# Patient Record
Sex: MALE | Race: WHITE | Employment: OTHER | URBAN - METROPOLITAN AREA
[De-identification: names, ages, dates, MRNs, and addresses within clinical notes are randomized per-mention and may not be internally consistent; named-entity substitution may affect disease eponyms.]

---

## 2019-03-14 ENCOUNTER — OFFICE VISIT (OUTPATIENT)
Dept: SURGERY | Age: 69
End: 2019-03-14
Payer: MEDICARE

## 2019-03-14 VITALS
HEIGHT: 71 IN | BODY MASS INDEX: 22.4 KG/M2 | SYSTOLIC BLOOD PRESSURE: 160 MMHG | WEIGHT: 160 LBS | DIASTOLIC BLOOD PRESSURE: 92 MMHG

## 2019-03-14 DIAGNOSIS — R10.31 RLQ ABDOMINAL PAIN: Primary | ICD-10-CM

## 2019-03-14 PROCEDURE — 99203 OFFICE O/P NEW LOW 30 MIN: CPT | Performed by: SURGERY

## 2019-03-14 ASSESSMENT — ENCOUNTER SYMPTOMS
ABDOMINAL PAIN: 1
ALLERGIC/IMMUNOLOGIC NEGATIVE: 1
EYE ITCHING: 1
RESPIRATORY NEGATIVE: 1

## 2019-03-21 ENCOUNTER — TELEPHONE (OUTPATIENT)
Dept: SURGERY | Age: 69
End: 2019-03-21

## 2019-03-21 ENCOUNTER — HOSPITAL ENCOUNTER (INPATIENT)
Age: 69
LOS: 1 days | Discharge: HOME | DRG: 343 | End: 2019-03-23
Attending: EMERGENCY MEDICINE | Admitting: SURGERY
Payer: MEDICARE

## 2019-03-21 ENCOUNTER — HOSPITAL ENCOUNTER (OUTPATIENT)
Dept: CT IMAGING | Age: 69
Discharge: HOME | End: 2019-03-21
Payer: MEDICARE

## 2019-03-21 DIAGNOSIS — K35.80 ACUTE APPENDICITIS, UNSPECIFIED ACUTE APPENDICITIS TYPE: Primary | ICD-10-CM

## 2019-03-21 DIAGNOSIS — R10.31 RLQ ABDOMINAL PAIN: ICD-10-CM

## 2019-03-21 LAB
CREAT SERPL-MCNC: 1 MG/DL (ref 0.8–1.3)
GFR AFRICAN AMERICAN: >60
GFR NON-AFRICAN AMERICAN: >60

## 2019-03-21 PROCEDURE — 6360000004 HC RX CONTRAST MEDICATION: Performed by: SURGERY

## 2019-03-21 PROCEDURE — 80053 COMPREHEN METABOLIC PANEL: CPT

## 2019-03-21 PROCEDURE — 99284 EMERGENCY DEPT VISIT MOD MDM: CPT

## 2019-03-21 PROCEDURE — 6360000002 HC RX W HCPCS: Performed by: PHYSICIAN ASSISTANT

## 2019-03-21 PROCEDURE — 36415 COLL VENOUS BLD VENIPUNCTURE: CPT

## 2019-03-21 PROCEDURE — 74177 CT ABD & PELVIS W/CONTRAST: CPT

## 2019-03-21 PROCEDURE — 85025 COMPLETE CBC W/AUTO DIFF WBC: CPT

## 2019-03-21 PROCEDURE — 82565 ASSAY OF CREATININE: CPT

## 2019-03-21 RX ADMIN — TAZOBACTAM SODIUM AND PIPERACILLIN SODIUM 3.38 G: 375; 3 INJECTION, SOLUTION INTRAVENOUS at 23:57

## 2019-03-21 RX ADMIN — IOPAMIDOL 75 ML: 755 INJECTION, SOLUTION INTRAVENOUS at 18:33

## 2019-03-21 RX ADMIN — IOHEXOL 50 ML: 240 INJECTION, SOLUTION INTRATHECAL; INTRAVASCULAR; INTRAVENOUS; ORAL at 18:33

## 2019-03-21 ASSESSMENT — PAIN SCALES - GENERAL: PAINLEVEL_OUTOF10: 4

## 2019-03-22 ENCOUNTER — ANESTHESIA EVENT (OUTPATIENT)
Dept: OPERATING ROOM | Age: 69
DRG: 343 | End: 2019-03-22
Payer: MEDICARE

## 2019-03-22 ENCOUNTER — ANESTHESIA (OUTPATIENT)
Dept: OPERATING ROOM | Age: 69
DRG: 343 | End: 2019-03-22
Payer: MEDICARE

## 2019-03-22 VITALS
SYSTOLIC BLOOD PRESSURE: 154 MMHG | RESPIRATION RATE: 5 BRPM | TEMPERATURE: 97.9 F | DIASTOLIC BLOOD PRESSURE: 92 MMHG | OXYGEN SATURATION: 100 %

## 2019-03-22 PROBLEM — K35.80 APPENDICITIS, ACUTE: Status: ACTIVE | Noted: 2019-03-22

## 2019-03-22 LAB
A/G RATIO: 1.7 (ref 1.1–2.2)
ALBUMIN SERPL-MCNC: 4.4 G/DL (ref 3.4–5)
ALP BLD-CCNC: 52 U/L (ref 40–129)
ALT SERPL-CCNC: 9 U/L (ref 10–40)
ANION GAP SERPL CALCULATED.3IONS-SCNC: 11 MMOL/L (ref 3–16)
AST SERPL-CCNC: 14 U/L (ref 15–37)
BASOPHILS ABSOLUTE: 0 K/UL (ref 0–0.2)
BASOPHILS RELATIVE PERCENT: 0.9 %
BILIRUB SERPL-MCNC: 0.5 MG/DL (ref 0–1)
BUN BLDV-MCNC: 18 MG/DL (ref 7–20)
CALCIUM SERPL-MCNC: 8.9 MG/DL (ref 8.3–10.6)
CHLORIDE BLD-SCNC: 100 MMOL/L (ref 99–110)
CO2: 27 MMOL/L (ref 21–32)
CREAT SERPL-MCNC: 1 MG/DL (ref 0.8–1.3)
EOSINOPHILS ABSOLUTE: 0.1 K/UL (ref 0–0.6)
EOSINOPHILS RELATIVE PERCENT: 3.1 %
GFR AFRICAN AMERICAN: >60
GFR NON-AFRICAN AMERICAN: >60
GLOBULIN: 2.6 G/DL
GLUCOSE BLD-MCNC: 91 MG/DL (ref 70–99)
HCT VFR BLD CALC: 40 % (ref 40.5–52.5)
HEMOGLOBIN: 13.9 G/DL (ref 13.5–17.5)
LYMPHOCYTES ABSOLUTE: 1.3 K/UL (ref 1–5.1)
LYMPHOCYTES RELATIVE PERCENT: 30.9 %
MCH RBC QN AUTO: 33.5 PG (ref 26–34)
MCHC RBC AUTO-ENTMCNC: 34.8 G/DL (ref 31–36)
MCV RBC AUTO: 96.1 FL (ref 80–100)
MONOCYTES ABSOLUTE: 0.2 K/UL (ref 0–1.3)
MONOCYTES RELATIVE PERCENT: 5.1 %
NEUTROPHILS ABSOLUTE: 2.4 K/UL (ref 1.7–7.7)
NEUTROPHILS RELATIVE PERCENT: 60 %
PDW BLD-RTO: 13.3 % (ref 12.4–15.4)
PLATELET # BLD: 133 K/UL (ref 135–450)
PMV BLD AUTO: 9.6 FL (ref 5–10.5)
POTASSIUM SERPL-SCNC: 4.1 MMOL/L (ref 3.5–5.1)
RBC # BLD: 4.16 M/UL (ref 4.2–5.9)
SODIUM BLD-SCNC: 138 MMOL/L (ref 136–145)
TOTAL PROTEIN: 7 G/DL (ref 6.4–8.2)
WBC # BLD: 4 K/UL (ref 4–11)

## 2019-03-22 PROCEDURE — 6360000002 HC RX W HCPCS: Performed by: ANESTHESIOLOGY

## 2019-03-22 PROCEDURE — 2580000003 HC RX 258: Performed by: SURGERY

## 2019-03-22 PROCEDURE — 6360000002 HC RX W HCPCS: Performed by: SURGERY

## 2019-03-22 PROCEDURE — 2500000003 HC RX 250 WO HCPCS: Performed by: NURSE ANESTHETIST, CERTIFIED REGISTERED

## 2019-03-22 PROCEDURE — 0DTJ4ZZ RESECTION OF APPENDIX, PERCUTANEOUS ENDOSCOPIC APPROACH: ICD-10-PCS | Performed by: SURGERY

## 2019-03-22 PROCEDURE — 2500000003 HC RX 250 WO HCPCS: Performed by: SURGERY

## 2019-03-22 PROCEDURE — 6360000002 HC RX W HCPCS: Performed by: NURSE ANESTHETIST, CERTIFIED REGISTERED

## 2019-03-22 PROCEDURE — 7100000000 HC PACU RECOVERY - FIRST 15 MIN: Performed by: SURGERY

## 2019-03-22 PROCEDURE — 7100000001 HC PACU RECOVERY - ADDTL 15 MIN: Performed by: SURGERY

## 2019-03-22 PROCEDURE — 88304 TISSUE EXAM BY PATHOLOGIST: CPT

## 2019-03-22 PROCEDURE — 99233 SBSQ HOSP IP/OBS HIGH 50: CPT | Performed by: SURGERY

## 2019-03-22 PROCEDURE — 6360000002 HC RX W HCPCS: Performed by: NURSE PRACTITIONER

## 2019-03-22 PROCEDURE — 1200000000 HC SEMI PRIVATE

## 2019-03-22 PROCEDURE — 44970 LAPAROSCOPY APPENDECTOMY: CPT | Performed by: SURGERY

## 2019-03-22 PROCEDURE — APPNB30 APP NON BILLABLE TIME 0-30 MINS: Performed by: NURSE PRACTITIONER

## 2019-03-22 PROCEDURE — C1773 RET DEV, INSERTABLE: HCPCS | Performed by: SURGERY

## 2019-03-22 PROCEDURE — 2709999900 HC NON-CHARGEABLE SUPPLY: Performed by: SURGERY

## 2019-03-22 PROCEDURE — 3600000004 HC SURGERY LEVEL 4 BASE: Performed by: SURGERY

## 2019-03-22 PROCEDURE — 3700000001 HC ADD 15 MINUTES (ANESTHESIA): Performed by: SURGERY

## 2019-03-22 PROCEDURE — APPSS45 APP SPLIT SHARED TIME 31-45 MINUTES: Performed by: NURSE PRACTITIONER

## 2019-03-22 PROCEDURE — 2500000003 HC RX 250 WO HCPCS: Performed by: NURSE PRACTITIONER

## 2019-03-22 PROCEDURE — 3700000000 HC ANESTHESIA ATTENDED CARE: Performed by: SURGERY

## 2019-03-22 PROCEDURE — 2720000010 HC SURG SUPPLY STERILE: Performed by: SURGERY

## 2019-03-22 PROCEDURE — 3600000014 HC SURGERY LEVEL 4 ADDTL 15MIN: Performed by: SURGERY

## 2019-03-22 PROCEDURE — 6370000000 HC RX 637 (ALT 250 FOR IP): Performed by: SURGERY

## 2019-03-22 PROCEDURE — 2580000003 HC RX 258: Performed by: NURSE ANESTHETIST, CERTIFIED REGISTERED

## 2019-03-22 RX ORDER — HYDRALAZINE HYDROCHLORIDE 20 MG/ML
10 INJECTION INTRAMUSCULAR; INTRAVENOUS EVERY 6 HOURS PRN
Status: DISCONTINUED | OUTPATIENT
Start: 2019-03-22 | End: 2019-03-23 | Stop reason: HOSPADM

## 2019-03-22 RX ORDER — MIDAZOLAM HYDROCHLORIDE 1 MG/ML
0.5 INJECTION INTRAMUSCULAR; INTRAVENOUS ONCE
Status: COMPLETED | OUTPATIENT
Start: 2019-03-22 | End: 2019-03-22

## 2019-03-22 RX ORDER — SODIUM CHLORIDE, SODIUM LACTATE, POTASSIUM CHLORIDE, CALCIUM CHLORIDE 600; 310; 30; 20 MG/100ML; MG/100ML; MG/100ML; MG/100ML
INJECTION, SOLUTION INTRAVENOUS CONTINUOUS PRN
Status: DISCONTINUED | OUTPATIENT
Start: 2019-03-22 | End: 2019-03-22 | Stop reason: SDUPTHER

## 2019-03-22 RX ORDER — MORPHINE SULFATE 2 MG/ML
2 INJECTION, SOLUTION INTRAMUSCULAR; INTRAVENOUS
Status: DISCONTINUED | OUTPATIENT
Start: 2019-03-22 | End: 2019-03-23 | Stop reason: HOSPADM

## 2019-03-22 RX ORDER — HYDROMORPHONE HCL 110MG/55ML
0.25 PATIENT CONTROLLED ANALGESIA SYRINGE INTRAVENOUS EVERY 5 MIN PRN
Status: DISCONTINUED | OUTPATIENT
Start: 2019-03-22 | End: 2019-03-22 | Stop reason: HOSPADM

## 2019-03-22 RX ORDER — SODIUM CHLORIDE, SODIUM LACTATE, POTASSIUM CHLORIDE, CALCIUM CHLORIDE 600; 310; 30; 20 MG/100ML; MG/100ML; MG/100ML; MG/100ML
INJECTION, SOLUTION INTRAVENOUS CONTINUOUS
Status: DISCONTINUED | OUTPATIENT
Start: 2019-03-22 | End: 2019-03-23 | Stop reason: HOSPADM

## 2019-03-22 RX ORDER — FENTANYL CITRATE 50 UG/ML
INJECTION, SOLUTION INTRAMUSCULAR; INTRAVENOUS PRN
Status: DISCONTINUED | OUTPATIENT
Start: 2019-03-22 | End: 2019-03-22 | Stop reason: SDUPTHER

## 2019-03-22 RX ORDER — LIDOCAINE HYDROCHLORIDE 10 MG/ML
1 INJECTION, SOLUTION EPIDURAL; INFILTRATION; INTRACAUDAL; PERINEURAL
Status: CANCELLED | OUTPATIENT
Start: 2019-03-22 | End: 2019-03-22

## 2019-03-22 RX ORDER — HYDROCODONE BITARTRATE AND ACETAMINOPHEN 5; 325 MG/1; MG/1
1 TABLET ORAL
Status: DISCONTINUED | OUTPATIENT
Start: 2019-03-22 | End: 2019-03-22 | Stop reason: HOSPADM

## 2019-03-22 RX ORDER — PROPOFOL 10 MG/ML
INJECTION, EMULSION INTRAVENOUS PRN
Status: DISCONTINUED | OUTPATIENT
Start: 2019-03-22 | End: 2019-03-22 | Stop reason: SDUPTHER

## 2019-03-22 RX ORDER — SUCCINYLCHOLINE CHLORIDE 20 MG/ML
INJECTION INTRAMUSCULAR; INTRAVENOUS PRN
Status: DISCONTINUED | OUTPATIENT
Start: 2019-03-22 | End: 2019-03-22 | Stop reason: SDUPTHER

## 2019-03-22 RX ORDER — TRISODIUM CITRATE DIHYDRATE AND CITRIC ACID MONOHYDRATE 500; 334 MG/5ML; MG/5ML
SOLUTION ORAL
Status: DISPENSED
Start: 2019-03-22 | End: 2019-03-23

## 2019-03-22 RX ORDER — FUROSEMIDE 10 MG/ML
20 INJECTION INTRAMUSCULAR; INTRAVENOUS ONCE
Status: COMPLETED | OUTPATIENT
Start: 2019-03-22 | End: 2019-03-22

## 2019-03-22 RX ORDER — SODIUM CHLORIDE 9 MG/ML
INJECTION, SOLUTION INTRAVENOUS CONTINUOUS
Status: CANCELLED | OUTPATIENT
Start: 2019-03-22

## 2019-03-22 RX ORDER — BUPIVACAINE HYDROCHLORIDE AND EPINEPHRINE 5; 5 MG/ML; UG/ML
INJECTION, SOLUTION PERINEURAL
Status: COMPLETED | OUTPATIENT
Start: 2019-03-22 | End: 2019-03-22

## 2019-03-22 RX ORDER — ONDANSETRON 2 MG/ML
4 INJECTION INTRAMUSCULAR; INTRAVENOUS
Status: DISCONTINUED | OUTPATIENT
Start: 2019-03-22 | End: 2019-03-22 | Stop reason: HOSPADM

## 2019-03-22 RX ORDER — ONDANSETRON 2 MG/ML
INJECTION INTRAMUSCULAR; INTRAVENOUS PRN
Status: DISCONTINUED | OUTPATIENT
Start: 2019-03-22 | End: 2019-03-22 | Stop reason: SDUPTHER

## 2019-03-22 RX ORDER — MAGNESIUM HYDROXIDE 1200 MG/15ML
LIQUID ORAL CONTINUOUS PRN
Status: COMPLETED | OUTPATIENT
Start: 2019-03-22 | End: 2019-03-22

## 2019-03-22 RX ORDER — GLYCOPYRROLATE 0.2 MG/ML
INJECTION INTRAMUSCULAR; INTRAVENOUS PRN
Status: DISCONTINUED | OUTPATIENT
Start: 2019-03-22 | End: 2019-03-22 | Stop reason: SDUPTHER

## 2019-03-22 RX ORDER — DEXAMETHASONE SODIUM PHOSPHATE 4 MG/ML
INJECTION, SOLUTION INTRA-ARTICULAR; INTRALESIONAL; INTRAMUSCULAR; INTRAVENOUS; SOFT TISSUE PRN
Status: DISCONTINUED | OUTPATIENT
Start: 2019-03-22 | End: 2019-03-22 | Stop reason: SDUPTHER

## 2019-03-22 RX ORDER — LIDOCAINE HYDROCHLORIDE 20 MG/ML
INJECTION, SOLUTION EPIDURAL; INFILTRATION; INTRACAUDAL; PERINEURAL PRN
Status: DISCONTINUED | OUTPATIENT
Start: 2019-03-22 | End: 2019-03-22 | Stop reason: SDUPTHER

## 2019-03-22 RX ORDER — VECURONIUM BROMIDE 1 MG/ML
INJECTION, POWDER, LYOPHILIZED, FOR SOLUTION INTRAVENOUS PRN
Status: DISCONTINUED | OUTPATIENT
Start: 2019-03-22 | End: 2019-03-22 | Stop reason: SDUPTHER

## 2019-03-22 RX ORDER — OXYCODONE HYDROCHLORIDE AND ACETAMINOPHEN 5; 325 MG/1; MG/1
1 TABLET ORAL EVERY 4 HOURS PRN
Status: DISCONTINUED | OUTPATIENT
Start: 2019-03-22 | End: 2019-03-23 | Stop reason: HOSPADM

## 2019-03-22 RX ORDER — ONDANSETRON 2 MG/ML
4 INJECTION INTRAMUSCULAR; INTRAVENOUS EVERY 4 HOURS PRN
Status: DISCONTINUED | OUTPATIENT
Start: 2019-03-22 | End: 2019-03-23 | Stop reason: HOSPADM

## 2019-03-22 RX ORDER — HYDROMORPHONE HCL 110MG/55ML
0.5 PATIENT CONTROLLED ANALGESIA SYRINGE INTRAVENOUS EVERY 5 MIN PRN
Status: DISCONTINUED | OUTPATIENT
Start: 2019-03-22 | End: 2019-03-22 | Stop reason: HOSPADM

## 2019-03-22 RX ORDER — AMLODIPINE BESYLATE 5 MG/1
5 TABLET ORAL DAILY
Status: COMPLETED | OUTPATIENT
Start: 2019-03-22 | End: 2019-03-22

## 2019-03-22 RX ADMIN — TAZOBACTAM SODIUM AND PIPERACILLIN SODIUM 3.38 G: 375; 3 INJECTION, SOLUTION INTRAVENOUS at 21:31

## 2019-03-22 RX ADMIN — VECURONIUM BROMIDE 5 MG: 1 INJECTION, POWDER, LYOPHILIZED, FOR SOLUTION INTRAVENOUS at 15:43

## 2019-03-22 RX ADMIN — PHENYLEPHRINE HYDROCHLORIDE 100 MCG: 10 INJECTION INTRAVENOUS at 15:58

## 2019-03-22 RX ADMIN — OXYCODONE AND ACETAMINOPHEN 1 TABLET: 5; 325 TABLET ORAL at 21:27

## 2019-03-22 RX ADMIN — HYDRALAZINE HYDROCHLORIDE 10 MG: 20 INJECTION INTRAMUSCULAR; INTRAVENOUS at 21:33

## 2019-03-22 RX ADMIN — HYDROMORPHONE HYDROCHLORIDE 0.5 MG: 2 INJECTION, SOLUTION INTRAMUSCULAR; INTRAVENOUS; SUBCUTANEOUS at 16:42

## 2019-03-22 RX ADMIN — ONDANSETRON 4 MG: 2 INJECTION INTRAMUSCULAR; INTRAVENOUS at 15:35

## 2019-03-22 RX ADMIN — TAZOBACTAM SODIUM AND PIPERACILLIN SODIUM 3.38 G: 375; 3 INJECTION, SOLUTION INTRAVENOUS at 05:45

## 2019-03-22 RX ADMIN — TAZOBACTAM SODIUM AND PIPERACILLIN SODIUM 3.38 G: 375; 3 INJECTION, SOLUTION INTRAVENOUS at 13:37

## 2019-03-22 RX ADMIN — FAMOTIDINE 20 MG: 10 INJECTION, SOLUTION INTRAVENOUS at 09:04

## 2019-03-22 RX ADMIN — SUCCINYLCHOLINE CHLORIDE 160 MG: 20 INJECTION, SOLUTION INTRAMUSCULAR; INTRAVENOUS at 15:35

## 2019-03-22 RX ADMIN — SODIUM CHLORIDE, POTASSIUM CHLORIDE, SODIUM LACTATE AND CALCIUM CHLORIDE: 600; 310; 30; 20 INJECTION, SOLUTION INTRAVENOUS at 15:28

## 2019-03-22 RX ADMIN — MIDAZOLAM 0.5 MG: 1 INJECTION INTRAMUSCULAR; INTRAVENOUS at 14:54

## 2019-03-22 RX ADMIN — MORPHINE SULFATE 2 MG: 2 INJECTION, SOLUTION INTRAMUSCULAR; INTRAVENOUS at 03:24

## 2019-03-22 RX ADMIN — LIDOCAINE HYDROCHLORIDE 100 MG: 20 INJECTION, SOLUTION EPIDURAL; INFILTRATION; INTRACAUDAL; PERINEURAL at 15:35

## 2019-03-22 RX ADMIN — SODIUM CHLORIDE, POTASSIUM CHLORIDE, SODIUM LACTATE AND CALCIUM CHLORIDE: 600; 310; 30; 20 INJECTION, SOLUTION INTRAVENOUS at 13:37

## 2019-03-22 RX ADMIN — SODIUM CHLORIDE, POTASSIUM CHLORIDE, SODIUM LACTATE AND CALCIUM CHLORIDE: 600; 310; 30; 20 INJECTION, SOLUTION INTRAVENOUS at 17:39

## 2019-03-22 RX ADMIN — FENTANYL CITRATE 25 MCG: 50 INJECTION, SOLUTION INTRAMUSCULAR; INTRAVENOUS at 16:16

## 2019-03-22 RX ADMIN — FENTANYL CITRATE 25 MCG: 50 INJECTION, SOLUTION INTRAMUSCULAR; INTRAVENOUS at 16:19

## 2019-03-22 RX ADMIN — SODIUM CHLORIDE, POTASSIUM CHLORIDE, SODIUM LACTATE AND CALCIUM CHLORIDE: 600; 310; 30; 20 INJECTION, SOLUTION INTRAVENOUS at 01:32

## 2019-03-22 RX ADMIN — MORPHINE SULFATE 2 MG: 2 INJECTION, SOLUTION INTRAMUSCULAR; INTRAVENOUS at 01:32

## 2019-03-22 RX ADMIN — FAMOTIDINE 20 MG: 10 INJECTION, SOLUTION INTRAVENOUS at 21:28

## 2019-03-22 RX ADMIN — DEXAMETHASONE SODIUM PHOSPHATE 4 MG: 4 INJECTION, SOLUTION INTRAMUSCULAR; INTRAVENOUS at 15:35

## 2019-03-22 RX ADMIN — GLYCOPYRROLATE 0.1 MG: 0.2 INJECTION INTRAMUSCULAR; INTRAVENOUS at 15:58

## 2019-03-22 RX ADMIN — PHENYLEPHRINE HYDROCHLORIDE 100 MCG: 10 INJECTION INTRAVENOUS at 15:49

## 2019-03-22 RX ADMIN — PROPOFOL 150 MG: 10 INJECTION, EMULSION INTRAVENOUS at 15:35

## 2019-03-22 RX ADMIN — FUROSEMIDE 20 MG: 10 INJECTION, SOLUTION INTRAMUSCULAR; INTRAVENOUS at 07:08

## 2019-03-22 RX ADMIN — FENTANYL CITRATE 100 MCG: 50 INJECTION, SOLUTION INTRAMUSCULAR; INTRAVENOUS at 15:35

## 2019-03-22 RX ADMIN — SUGAMMADEX 300 MG: 100 INJECTION, SOLUTION INTRAVENOUS at 16:15

## 2019-03-22 RX ADMIN — ENOXAPARIN SODIUM 40 MG: 40 INJECTION SUBCUTANEOUS at 21:27

## 2019-03-22 RX ADMIN — GLYCOPYRROLATE 0.2 MG: 0.2 INJECTION INTRAMUSCULAR; INTRAVENOUS at 15:49

## 2019-03-22 RX ADMIN — AMLODIPINE BESYLATE 5 MG: 5 TABLET ORAL at 07:08

## 2019-03-22 RX ADMIN — HYDRALAZINE HYDROCHLORIDE 10 MG: 20 INJECTION INTRAMUSCULAR; INTRAVENOUS at 11:28

## 2019-03-22 ASSESSMENT — PAIN DESCRIPTION - PAIN TYPE
TYPE: SURGICAL PAIN
TYPE: SURGICAL PAIN
TYPE: ACUTE PAIN

## 2019-03-22 ASSESSMENT — PULMONARY FUNCTION TESTS
PIF_VALUE: 18
PIF_VALUE: 15
PIF_VALUE: 11
PIF_VALUE: 14
PIF_VALUE: 19
PIF_VALUE: 16
PIF_VALUE: 15
PIF_VALUE: 3
PIF_VALUE: 18
PIF_VALUE: 0
PIF_VALUE: 18
PIF_VALUE: 1
PIF_VALUE: 11
PIF_VALUE: 18
PIF_VALUE: 16
PIF_VALUE: 20
PIF_VALUE: 1
PIF_VALUE: 18
PIF_VALUE: 14
PIF_VALUE: 19
PIF_VALUE: 15
PIF_VALUE: 23
PIF_VALUE: 19
PIF_VALUE: 1
PIF_VALUE: 13
PIF_VALUE: 11
PIF_VALUE: 21
PIF_VALUE: 18
PIF_VALUE: 14
PIF_VALUE: 15
PIF_VALUE: 2
PIF_VALUE: 15
PIF_VALUE: 17
PIF_VALUE: 19
PIF_VALUE: 15
PIF_VALUE: 18
PIF_VALUE: 3
PIF_VALUE: 1
PIF_VALUE: 3
PIF_VALUE: 1
PIF_VALUE: 1
PIF_VALUE: 0
PIF_VALUE: 18
PIF_VALUE: 6
PIF_VALUE: 18
PIF_VALUE: 14
PIF_VALUE: 12
PIF_VALUE: 19
PIF_VALUE: 1
PIF_VALUE: 14
PIF_VALUE: 13
PIF_VALUE: 18
PIF_VALUE: 15
PIF_VALUE: 15
PIF_VALUE: 14
PIF_VALUE: 18
PIF_VALUE: 12
PIF_VALUE: 17

## 2019-03-22 ASSESSMENT — ENCOUNTER SYMPTOMS
COLOR CHANGE: 0
COUGH: 0
SHORTNESS OF BREATH: 0
ALLERGIC/IMMUNOLOGIC NEGATIVE: 1
NAUSEA: 0
ABDOMINAL DISTENTION: 0
DIARRHEA: 0
VOMITING: 0
WHEEZING: 0
CONSTIPATION: 0
BACK PAIN: 0
STRIDOR: 0
ABDOMINAL PAIN: 1

## 2019-03-22 ASSESSMENT — PAIN SCALES - GENERAL
PAINLEVEL_OUTOF10: 7
PAINLEVEL_OUTOF10: 4
PAINLEVEL_OUTOF10: 7
PAINLEVEL_OUTOF10: 1
PAINLEVEL_OUTOF10: 4
PAINLEVEL_OUTOF10: 1
PAINLEVEL_OUTOF10: 1

## 2019-03-22 ASSESSMENT — PAIN DESCRIPTION - DESCRIPTORS
DESCRIPTORS: DISCOMFORT
DESCRIPTORS: SHARP;PRESSURE
DESCRIPTORS: DISCOMFORT

## 2019-03-22 ASSESSMENT — PAIN DESCRIPTION - LOCATION
LOCATION: ABDOMEN

## 2019-03-22 ASSESSMENT — PAIN DESCRIPTION - ONSET
ONSET: ON-GOING
ONSET: ON-GOING

## 2019-03-22 ASSESSMENT — PAIN DESCRIPTION - ORIENTATION
ORIENTATION: RIGHT;LOWER
ORIENTATION: UPPER;MID
ORIENTATION: MID
ORIENTATION: RIGHT;LOWER

## 2019-03-22 ASSESSMENT — PAIN - FUNCTIONAL ASSESSMENT: PAIN_FUNCTIONAL_ASSESSMENT: 0-10

## 2019-03-22 ASSESSMENT — PAIN DESCRIPTION - FREQUENCY
FREQUENCY: CONTINUOUS
FREQUENCY: CONTINUOUS

## 2019-03-22 ASSESSMENT — PAIN DESCRIPTION - PROGRESSION
CLINICAL_PROGRESSION: NOT CHANGED
CLINICAL_PROGRESSION: NOT CHANGED

## 2019-03-22 NOTE — PROGRESS NOTES
Darell Huynh, general surgery NP was in to see and speak to pt regarding lap appy planned for later this afternoon. B/P rechecked, was 170/96 and HR 76. Darell Huynh made aware of continued elevated blood pressure. New orders written.

## 2019-03-22 NOTE — PROGRESS NOTES
Pt to PACU in bed and in semi fowlers position. Resp adeq on 4L o2 NC, spo2 100%, VSS. Pt alert, denies needs or c/o. Surgical incisions to abdomen CDI, abdomen soft. Fluid infusing through IV per anesthesia. No s/s distress noted. Will continue to monitor.

## 2019-03-22 NOTE — H&P
[facility] [address] [facility]        History and Physical    Patient Name: Ale Verdin  MRN: [id number]  YOB: 1950  Admission Date: 3/21/2019 11:30 PM   Date of Evaluation: 3/22/2019  Primary Care Physician: No primary care provider on file. SUBJECTIVE    History of Present Illness:  Mr. Chamorro is a 77 y.o. male who presents with severe, burning, right lower quadrant abdominal pain that began about 2 weeks earlier and gradually worsened. The pain is nonradiating and worse with movement. He was evaluated by Dr. Aracelis Baltazar in the office about 8 days earlier for complains of a bulge in the abdominal wall in the same location. Outpatient CT imaging yesterday revealed acute appendicitis and he was admitted for further treatment. Associated symptoms include abdominal bloating, along with diarrhea. He denies nausea, vomiting, changes in appetite, fevers, chills, hematochezia/melena, dysuria, hematuria, chest pain, SOB, or similar symptoms in the past.  Prior surgeries include a right inguinal hernia repair in 2013. No significant medical history, no blood thinners. He is a nonsmoker. Past Medical History:      Diagnosis Date    Hernia of scrotum        Past Surgical History:      Procedure Laterality Date    INGUINAL HERNIA REPAIR  8/6/2013    RIGHT    NOSE SURGERY         Scheduled Meds:   piperacillin-tazobactam  3.375 g Intravenous Q8H    enoxaparin  40 mg Subcutaneous Nightly    famotidine (PEPCID) injection  20 mg Intravenous BID     Continuous Infusions:   lactated ringers 100 mL/hr at 03/22/19 0132     PRN Meds:.morphine, oxyCODONE-acetaminophen, ondansetron    Prior to Admission medications    Not on File        Allergies:  Patient has no known allergies.     Social History:  Social History     Socioeconomic History    Marital status:      Spouse name: None    Number of children: None    Years of education: None    Highest education level: None   Occupational History    None   Social Needs    Financial resource strain: None    Food insecurity:     Worry: None     Inability: None    Transportation needs:     Medical: None     Non-medical: None   Tobacco Use    Smoking status: Never Smoker    Smokeless tobacco: Never Used   Substance and Sexual Activity    Alcohol use: No    Drug use: No    Sexual activity: None   Lifestyle    Physical activity:     Days per week: None     Minutes per session: None    Stress: None   Relationships    Social connections:     Talks on phone: None     Gets together: None     Attends Jew service: None     Active member of club or organization: None     Attends meetings of clubs or organizations: None     Relationship status: None    Intimate partner violence:     Fear of current or ex partner: None     Emotionally abused: None     Physically abused: None     Forced sexual activity: None   Other Topics Concern    None   Social History Narrative    None       Family History:    History reviewed. No pertinent family history.     Review of Systems:  CONSTITUTIONAL:  negative  HEENT:  negative  RESPIRATORY:  negative  CARDIOVASCULAR:  negative  GASTROINTESTINAL:  negative except for diarrhea, abdominal pain and abdominal distention  GENITOURINARY:  negative  HEMATOLOGIC/LYMPHATIC:  negative  NEUROLOGICAL:  negative  SKIN:  negative      OBJECTIVE    Vital Signs:  Patient Vitals for the past 24 hrs:   BP Temp Temp src Pulse Resp SpO2 Height Weight   03/22/19 0815 (!) 153/102 98.6 °F (37 °C) Oral 76 16 97 % -- --   03/22/19 0610 (!) 175/101 -- -- 77 16 99 % -- --   03/22/19 0600 (!) 182/100 -- -- 74 16 -- -- --   03/22/19 0421 (!) 166/89 -- -- -- -- -- -- --   03/22/19 0402 (!) 169/100 -- -- -- -- -- -- --   03/22/19 0400 (!) 177/103 98.4 °F (36.9 °C) Oral 70 18 -- -- --   03/22/19 0200 (!) 166/98 -- -- 69 -- -- -- --   03/22/19 0030 (!) 168/100 98.1 °F (36.7 °C) Oral -- 16 97 % 5' 11\" (1.803 m) 156 lb 4.8 oz (70.9 kg)   19 0015 (!) 158/100 -- -- 70 16 97 % -- --   19 2345 (!) 163/100 -- -- 77 16 96 % -- --   19 2235 (!) 186/101 97.6 °F (36.4 °C) -- 86 16 98 % 5' 11\" (1.803 m) 160 lb (72.6 kg)      TEMPERATURE HISTORY 24H: Temp (24hrs), Av.2 °F (36.8 °C), Min:97.6 °F (36.4 °C), Max:98.6 °F (37 °C)    BLOOD PRESSURE HISTORY: Systolic (81DLK), SBP:530 , Min:153 , SVC:395    Diastolic (17XIR), QNB:76, Min:89, Max:103    Admission Weight: 160 lb (72.6 kg)       Intake/Output Summary (Last 24 hours) at 3/22/2019 0914  Last data filed at 3/22/2019 5752  Gross per 24 hour   Intake 471.67 ml   Output 900 ml   Net -428.33 ml     Drain/tube Output:         Physical Exam:  CONSTITUTIONAL:  alert, no apparent distress and normal weight  NEUROLOGIC:  Mental Status Exam:  Level of Alertness:   awake  Orientation:   person, place, time  EYES:  sclera clear  ENT:  normocepalic, without obvious abnormality  NECK:  supple, symmetrical, trachea midline  LUNGS:  clear to auscultation  CARDIOVASCULAR:  regular rate and rhythm and no murmur noted  ABDOMEN: soft, non-distended, mild tenderness noted in the right lower quadrant, voluntary guarding absent, no masses palpated, normal bowel sounds, and hernia absent  Extremities: no edema  SKIN:  no bruising or bleeding and normal skin color, texture, turgor    Labs:  CBC:    Recent Labs     19  2355   WBC 4.0   HGB 13.9   HCT 40.0*   *     BMP:    Recent Labs     19  1739 19  2355   NA  --  138   K  --  4.1   CL  --  100   CO2  --  27   BUN  --  18   CREATININE 1.0 1.0   GLUCOSE  --  91     Hepatic:   Recent Labs     19  2355   AST 14*   ALT 9*   BILITOT 0.5   ALKPHOS 52     Amylase: No results for input(s): AMYLASE in the last 72 hours. Lipase: No results for input(s): LIPASE in the last 72 hours. Mag:    No results for input(s): MG in the last 72 hours. Phos:   No results for input(s): PHOS in the last 72 hours.    Coags: No results for input(s): INR, APTT in the last 72 hours. Imaging:  I have personally reviewed the following films:  CT ABDOMEN PELVIS W IV CONTRAST Additional Contrast? Oral  Narrative: EXAMINATION:  CT OF THE ABDOMEN AND PELVIS WITH CONTRAST 3/21/2019 3:17 pm    TECHNIQUE:  CT of the abdomen and pelvis was performed with the administration of  intravenous contrast. Multiplanar reformatted images are provided for review. Dose modulation, iterative reconstruction, and/or weight based adjustment of  the mA/kV was utilized to reduce the radiation dose to as low as reasonably  achievable. COMPARISON:  08/05/2013    HISTORY:  ORDERING SYSTEM PROVIDED HISTORY: RLQ abdominal pain  TECHNOLOGIST PROVIDED HISTORY:  Additional Contrast?->Oral  Reason for exam:->RLQ pain  Ordering Physician Provided Reason for Exam: RLQ pain  Acuity: Unknown  Type of Exam: Unknown    FINDINGS:  Lower Chest: Visualized lungs are clear. Base of the heart unremarkable. Organs: The liver enhances homogeneously. Normal gallbladder. Normal  spleen, adrenals, and pancreas. No acute renal abnormalities are identified. No hydronephrosis or  hydroureter. GI/Bowel: Mild diverticulosis of the large bowel is present, without CT  evidence of diverticulitis. The appendix is mildly dilated, especially when compared to the previous  exam, and periappendiceal fat stranding is seen, also new when compared to  the previous exam.  Mucosal hyperenhancement within the appendix noted as  well, best visualized on the coronal images. The small bowel is unremarkable. The mural thickening seen previously within  the right lower quadrant small bowel is resolved. The stomach and duodenum  are unremarkable in appearance. Pelvis: There is moderate to marked prostatic hypertrophy, increased when  compared to the previous exam, now measuring 6.2 cm maximally. Urinary  bladder incompletely distended.     Peritoneum/Retroperitoneum: Moderate vascular calcifications within the  abdominal aorta. The abdominal aorta is normal in caliber. The superior  mesenteric artery is enhancing. Bones/Soft Tissues: No osteolytic or osteoblastic bone lesions are  identified. No acute bony abnormalities detected. Impression: Acute appendicitis, without evidence of rupture. Findings were discussed with Ceferino Baltazar MD at 8:45 pm on 3/21/2019. Cultures:  Anaerobic culture  No results for input(s): LABANAE in the last 72 hours. Blood culture  No results for input(s): BC in the last 72 hours. Blood culture 2  No results for input(s): BLOODCULT2 in the last 72 hours. Body fluid culture  No results for input(s): BLOODCULT2 in the last 72 hours. Surgical culture  No results for input(s): CXSURG in the last 72 hours. Fecal occult  No results for input(s): OCCULTBLDFEC in the last 72 hours. Gram stain  No results for input(s): LABGRAM in the last 72 hours. Stool culture 1  No results for input(s): CXST in the last 72 hours. Stool culture 2  No results for input(s): STOOLCULT2 in the last 72 hours. Urine culture  No results for input(s): LABURIN in the last 72 hours. Wound abscess  No results for input(s): WNDABS in the last 72 hours. ASSESSMENT:  77 y.o. male admitted with   1. Acute appendicitis, unspecified acute appendicitis type        Acute appendicitis      PLAN:  1. Laparoscopic appendectomy this afternoon with Dr. Julienne Villalba  2. NPO diet  3. IV hydration  4. Antibiotics  5. Activity as tolerated  6. Pulmonary toilet, incentive spirometry  7. PRN analgesics and antiemetics--minimizing narcotics as tolerated  8. DVT prophylaxis with Lovenox    EDUCATION:  Educated patient on plan of care and disease process--all questions answered. The patient is not currently smoking. Recommend maintaining a smoke-free lifestyle. Plans discussed with patient and nursing. Reviewed and discuss with Dr. Julienne Villalba.        Signed:  Марина Ambrose Carlos Estes, KARLY - CNP  3/22/2019 9:14 AM     Patient had outpatient CT scan yesterday which demonstrated acute appendicitis  He reports a history of R sided abdominal pain over the last 2 weeks  Physical exam reveals R mid abdominal tenderness which correlates with the inflammatory changes seen on CT  Will plan for lap appy and possible spigelian hernia repair

## 2019-03-22 NOTE — PROGRESS NOTES
Shift assessment completed and documented at this time. Pt resting quietly in bed, resps easy and unlabored. States he has some pain to right lower quad. But denies need for pain medication at this time. Surgical consent obtained for lap appy later this afternoon and placed on chart. Pt did c/o some charley horse like pain in his calves. Assisted pt to ambulate in hallway to stretch his legs. He reports he usually exercises an hour daily at home and is not used to [employer] around. \" The care plan and education has been reviewed and mutually agreed upon with the patient. Call light in reach. Will continue to monitor.

## 2019-03-22 NOTE — BRIEF OP NOTE
Brief Postoperative Note  ______________________________________________________________    Patient: Emily Rodriguez  YOB: 1950  MRN: [id number]  Date of Procedure: 3/22/2019    Pre-Op Diagnosis: Appendicitis    Post-Op Diagnosis: Same       Procedure(s):  LAPAROSCOPIC APPENDECTOMY    Anesthesia: General    Surgeon(s):  Michelle Ellis MD    Assistant: Jonathan Song    Estimated Blood Loss (mL): less than 50     Complications: None    Specimens:   ID Type Source Tests Collected by Time Destination   A : A) APPENDIX Tissue Appendix SURGICAL PATHOLOGY Michelle Ellis MD 3/22/2019 1521        Implants:  * No implants in log *      Drains: * No LDAs found *    Findings: acute appendicitis    Michelle Ellis MD  Date: 3/22/2019  Time: 4:15 PM

## 2019-03-22 NOTE — PROGRESS NOTES
Pt with c/o gas pains. Assisted to dangle on side of bed and pt was able to belch a few times and stated \"that feels better. \" Ambulated in powers and returned to BR and then up to chair at bedside for dinner. Numerous family members visiting at bedside. Call light in reach.

## 2019-03-22 NOTE — ED PROVIDER NOTES
pain and neck stiffness. Skin: Negative for color change, pallor, rash and wound. Allergic/Immunologic: Negative. Neurological: Negative for dizziness, light-headedness and headaches. Hematological: Negative. Psychiatric/Behavioral: Negative for confusion. All other systems reviewed and are negative. Positives and Pertinent negatives as per HPI. Except as noted abovein the ROS, all other systems were reviewed and negative. PAST MEDICAL HISTORY     Past Medical History:   Diagnosis Date    Hernia of scrotum          SURGICAL HISTORY     Past Surgical History:   Procedure Laterality Date    INGUINAL HERNIA REPAIR  8/6/2013    RIGHT    NOSE SURGERY           CURRENTMEDICATIONS       Previous Medications    No medications on file         ALLERGIES     Patient has no known allergies. FAMILYHISTORY     History reviewed. No pertinent family history.        SOCIAL HISTORY       Social History     Socioeconomic History    Marital status:      Spouse name: None    Number of children: None    Years of education: None    Highest education level: None   Occupational History    None   Social Needs    Financial resource strain: None    Food insecurity:     Worry: None     Inability: None    Transportation needs:     Medical: None     Non-medical: None   Tobacco Use    Smoking status: Never Smoker    Smokeless tobacco: Never Used   Substance and Sexual Activity    Alcohol use: No    Drug use: No    Sexual activity: None   Lifestyle    Physical activity:     Days per week: None     Minutes per session: None    Stress: None   Relationships    Social connections:     Talks on phone: None     Gets together: None     Attends Jain service: None     Active member of club or organization: None     Attends meetings of clubs or organizations: None     Relationship status: None    Intimate partner violence:     Fear of current or ex partner: None     Emotionally abused: None Physically abused: None     Forced sexual activity: None   Other Topics Concern    None   Social History Narrative    None       SCREENINGS             PHYSICAL EXAM    (up to 7 for level 4, 8 or more for level 5)     ED Triage Vitals [03/21/19 2235]   BP Temp Temp src Pulse Resp SpO2 Height Weight   (!) 186/101 97.6 °F (36.4 °C) -- 86 16 98 % 5' 11\" (1.803 m) 160 lb (72.6 kg)       Physical Exam   Constitutional: He is oriented to person, place, and time. He appears well-developed and well-nourished. No distress. HENT:   Head: Normocephalic and atraumatic. Right Ear: External ear normal.   Left Ear: External ear normal.   Nose: Nose normal.   Mouth/Throat: Oropharynx is clear and moist.   Eyes: Pupils are equal, round, and reactive to light. Conjunctivae and EOM are normal. Right eye exhibits no discharge. Left eye exhibits no discharge. No scleral icterus. Neck: Normal range of motion. No JVD present. Cardiovascular: Normal rate. Pulmonary/Chest: Effort normal and breath sounds normal.   Abdominal: Soft. Bowel sounds are normal. He exhibits no abdominal bruit and no pulsatile midline mass. There is tenderness in the right lower quadrant. There is tenderness at McBurney's point. There is no rigidity, no rebound, no guarding, no CVA tenderness and negative Centeno's sign. Musculoskeletal: Normal range of motion. Lymphadenopathy:     He has no cervical adenopathy. Neurological: He is alert and oriented to person, place, and time. Skin: Skin is warm and dry. Capillary refill takes less than 2 seconds. No rash noted. He is not diaphoretic. No erythema. No pallor. Psychiatric: He has a normal mood and affect. His behavior is normal.   Nursing note and vitals reviewed. DIAGNOSTIC RESULTS   LABS:    Labs Reviewed   CBC WITH AUTO DIFFERENTIAL   COMPREHENSIVE METABOLIC PANEL       All other labs were within normal range or not returned as of this dictation. EKG:  All EKG's are interpreted by the Emergency Department Physician who either signs orCo-signs this chart in the absence of a cardiologist.  Please see their note for interpretation of EKG. RADIOLOGY:   Non-plain film images such as CT, Ultrasound and MRI are read by the radiologist. [facility] Rad radiographic images are visualized andpreliminarily interpreted by the  ED Provider with the below findings:        Interpretation perthe Radiologist below, if available at the time of this note:    No orders to display     Ct Abdomen Pelvis W Iv Contrast Additional Contrast? Oral    Result Date: 3/21/2019  EXAMINATION: CT OF THE ABDOMEN AND PELVIS WITH CONTRAST 3/21/2019 3:17 pm TECHNIQUE: CT of the abdomen and pelvis was performed with the administration of intravenous contrast. Multiplanar reformatted images are provided for review. Dose modulation, iterative reconstruction, and/or weight based adjustment of the mA/kV was utilized to reduce the radiation dose to as low as reasonably achievable. COMPARISON: 08/05/2013 HISTORY: ORDERING SYSTEM PROVIDED HISTORY: RLQ abdominal pain TECHNOLOGIST PROVIDED HISTORY: Additional Contrast?->Oral Reason for exam:->RLQ pain Ordering Physician Provided Reason for Exam: RLQ pain Acuity: Unknown Type of Exam: Unknown FINDINGS: Lower Chest: Visualized lungs are clear. Base of the heart unremarkable. Organs: The liver enhances homogeneously. Normal gallbladder. Normal spleen, adrenals, and pancreas. No acute renal abnormalities are identified. No hydronephrosis or hydroureter. GI/Bowel: Mild diverticulosis of the large bowel is present, without CT evidence of diverticulitis. The appendix is mildly dilated, especially when compared to the previous exam, and periappendiceal fat stranding is seen, also new when compared to the previous exam.  Mucosal hyperenhancement within the appendix noted as well, best visualized on the coronal images. The small bowel is unremarkable.   The mural thickening seen previously within the right lower quadrant small bowel is resolved. The stomach and duodenum are unremarkable in appearance. Pelvis: There is moderate to marked prostatic hypertrophy, increased when compared to the previous exam, now measuring 6.2 cm maximally. Urinary bladder incompletely distended. Peritoneum/Retroperitoneum: Moderate vascular calcifications within the abdominal aorta. The abdominal aorta is normal in caliber. The superior mesenteric artery is enhancing. Bones/Soft Tissues: No osteolytic or osteoblastic bone lesions are identified. No acute bony abnormalities detected. Acute appendicitis, without evidence of rupture. Findings were discussed with Kenzie Cantu MD at 8:45 pm on 3/21/2019. PROCEDURES   Unless otherwise noted below, none     Procedures    CRITICAL CARE TIME   Critical Care  There was a high probability of life-threatening clinical deterioration in the patient's condition requiring my urgent intervention. Total critical care time with the patient was 32 minutes excluding separately reportable procedures. Critical care required due to patients acute appendicitis prompting medical management, consultation and admission for surgical management in the morning per surgeon. CONSULTS:  IP CONSULT TO GENERAL SURGERY  I spoke with Dr. Vipul Crow at [facility], who will admit. EMERGENCY DEPARTMENT COURSE and DIFFERENTIALDIAGNOSIS/MDM:   Vitals:    Vitals:    03/21/19 2235 03/21/19 2345   BP: (!) 186/101 (!) 163/100   Pulse: 86 77   Resp: 16 16   Temp: 97.6 °F (36.4 °C)    SpO2: 98% 96%   Weight: 160 lb (72.6 kg)    Height: 5' 11\" (1.803 m)        Patient was given thefollowing medications:  Medications   piperacillin-tazobactam (ZOSYN) 3.375 g in dextrose 50 mL IVPB (premix) (3.375 g Intravenous New Bag 3/21/19 8860)       This patient presents with right lower quadrant abdominal pain. CT scan done outpatient shows acute appendicitis which appears to be relatively uncomplicated.   Patient and family understand and agree with plan for admission. Antibiotics initiated. Surgeon will admit for surgical management planned for morning. We have addressed concerns and expectations. FINAL IMPRESSION      1. Acute appendicitis, unspecified acute appendicitis type          DISPOSITION/PLAN   DISPOSITION Decision To Admit 03/21/2019 11:50:06 PM      PATIENT REFERREDTO:  No follow-up provider specified.     DISCHARGE MEDICATIONS:  New Prescriptions    No medications on file       DISCONTINUED MEDICATIONS:  Discontinued Medications    No medications on file              (Please note that portions ofthis note were completed with a voice recognition program.  Efforts were made to edit the dictations but occasionally words are mis-transcribed.)    Dusty Hardy PA-C (electronically signed)           Dsuty Hardy PA-C  03/22/19 0005

## 2019-03-22 NOTE — PLAN OF CARE
Problem: Falls - Risk of:  Goal: Will remain free from falls  Description  Will remain free from falls  Outcome: Ongoing  Goal: Absence of physical injury  Description  Absence of physical injury  Outcome: Ongoing     Problem: Pain:  Description  Pain management should include both nonpharmacologic and pharmacologic interventions.   Goal: Pain level will decrease  Description  Pain level will decrease  Outcome: Ongoing  Goal: Control of acute pain  Description  Control of acute pain  Outcome: Ongoing     Problem: Discharge Planning:  Goal: Patients continuum of care needs are met  Description  Patients continuum of care needs are met  Outcome: Ongoing

## 2019-03-22 NOTE — PROGRESS NOTES
New orders received. Pt notified with understanding voiced. Medications given, see MAR and flowsheets. Pt remains NPO for laparoscopic appendectomy today. Surgery scheduled time pending.

## 2019-03-22 NOTE — ED NOTES
Pt c/o abd pain in RLQ, bowel sounds present and active in all 4 quadrants, tenderness on palpation in AREA OF ABD. Pt does not have distention or rigid abdominal areas. Pt states no N/V, no blood, dysuria, or increased frequency of urination and bowel movement formed, brown, and no blood reported. Pt had CT done earlier today and was told it showed he had acute appendicitis and to come to the ER. Last ate and drank at 2000 last night.        Ney Robertson, MICHAEL  03/22/19 2117

## 2019-03-22 NOTE — PROGRESS NOTES
Pt returned to room per bed from PACU at this time. S/P lap appy with Dr Diane Walsh. Assessment as per flow sheet. Surgical lap sites x3 intact with surgical glue, ZEESHAN. Pt c/o some gas-like pain in upper mid abd. Ice pack intact to incisions. Call light in reach. Will continue to monitor.

## 2019-03-22 NOTE — ED NOTES
Report given to [facility] RN over phone. Pt alert and oriented and shows no signs of distress at time of transfer to [facility]. Pt taken to room by ED Tech in wheelchair.         Evonnie Severe, RN  03/22/19 0036

## 2019-03-23 VITALS
WEIGHT: 156 LBS | HEART RATE: 89 BPM | BODY MASS INDEX: 21.84 KG/M2 | SYSTOLIC BLOOD PRESSURE: 145 MMHG | OXYGEN SATURATION: 98 % | DIASTOLIC BLOOD PRESSURE: 81 MMHG | HEIGHT: 71 IN | TEMPERATURE: 97.5 F | RESPIRATION RATE: 16 BRPM

## 2019-03-23 PROCEDURE — 2580000003 HC RX 258: Performed by: SURGERY

## 2019-03-23 PROCEDURE — 99024 POSTOP FOLLOW-UP VISIT: CPT | Performed by: SURGERY

## 2019-03-23 PROCEDURE — 2500000003 HC RX 250 WO HCPCS: Performed by: SURGERY

## 2019-03-23 PROCEDURE — 6360000002 HC RX W HCPCS: Performed by: SURGERY

## 2019-03-23 RX ORDER — HYDROCODONE BITARTRATE AND ACETAMINOPHEN 5; 325 MG/1; MG/1
1-2 TABLET ORAL EVERY 4 HOURS PRN
Qty: 15 TABLET | Refills: 0 | Status: SHIPPED | OUTPATIENT
Start: 2019-03-23 | End: 2019-03-28

## 2019-03-23 RX ADMIN — SODIUM CHLORIDE, POTASSIUM CHLORIDE, SODIUM LACTATE AND CALCIUM CHLORIDE: 600; 310; 30; 20 INJECTION, SOLUTION INTRAVENOUS at 06:54

## 2019-03-23 RX ADMIN — TAZOBACTAM SODIUM AND PIPERACILLIN SODIUM 3.38 G: 375; 3 INJECTION, SOLUTION INTRAVENOUS at 06:53

## 2019-03-23 RX ADMIN — FAMOTIDINE 20 MG: 10 INJECTION, SOLUTION INTRAVENOUS at 09:51

## 2019-03-23 NOTE — DISCHARGE SUMMARY
[address]                     [address] [facility], [address] [place] [address] Drive                               DISCHARGE SUMMARY    PATIENT NAME: Jagdish Feliz                     :        1950  MED REC NO:   [id number]                          ROOM:       4468  ACCOUNT NO:   [de-identified]                           ADMIT DATE: 2019  PROVIDER:     Martine Swift MD                  DISCHARGE DATE:  2019    ADMITTING DIAGNOSES:  Right lower quadrant abdominal pain, acute  appendicitis. DISCHARGE DIAGNOSES:  Right lower quadrant abdominal pain, acute  appendicitis. PROCEDURE:  On 2019, laparoscopic appendectomy. HOSPITAL COURSE:  The patient is a 55-year-old male who underwent  outpatient CAT scan on 2019. The CAT scan showed acute  appendicitis. The patient was admitted to the hospital and underwent  laparoscopic appendectomy on 2019. He did well postoperatively. He was discharged home and instructed to resume all home medications. He was given a prescription for Norco as needed. His condition was  stable upon discharge. Followup will be with Dr. Kellen Stanton in two  weeks in the office.         Milind Mclean MD    D: 2019 11:36:45       T: 2019 16:31:03     [email or web address]  Job#: [id number]     Doc#: [id number]    CC:

## 2019-03-23 NOTE — PLAN OF CARE
Patient instructed on the use of the 0-10 pain scale. Patient will use this scale to report the level of pain. Pain is being controlled at this time.

## 2019-03-23 NOTE — PROGRESS NOTES
Shift assessment complete. Patient alert and oriented x4, ambulated length of powers and back with nurse at bedtime. PRN pain meds given x1, small amount of emesis after. Declined prn zofran at that time, nausea subsided. PRN hydralazine given, see MAR. The care plan and education has been reviewed and mutually agreed upon with the patient. Patient remains free from falls. SAFE sign on door. Bed and chair alarms being used. Bed in lowest position. Will monitor.      Electronically signed by Alaina England RN on 3/23/2019 at 2:42 AM

## 2019-03-23 NOTE — PLAN OF CARE
Problem: Falls - Risk of:  Goal: Will remain free from falls  Description  Will remain free from falls  Outcome: Ongoing  Note:   Pt remains free of falls at this time. Goal: Absence of physical injury  Description  Absence of physical injury  Outcome: Ongoing     Problem: Pain:  Goal: Pain level will decrease  Description  Pain level will decrease  Outcome: Ongoing  Goal: Control of acute pain  Description  Control of acute pain  Outcome: Ongoing  Note:   Pain has been manageable through means of assessing, interventions, and reassessing for duration of shift. Patient is able to rate pain on 0-10 pain scale. Pain level tolerable with PRN pain medications. Patient aware of medications, side effects and PRN schedule. Will continue to assess.        Problem: Discharge Planning:  Goal: Patients continuum of care needs are met  Description  Patients continuum of care needs are met  Outcome: Ongoing

## 2019-03-23 NOTE — OP NOTE
[facility] [address]                     [address] [facility], [address] [place] [address]                                OPERATIVE REPORT    PATIENT NAME: Carla Dunn                     :        1950  MED REC NO:   [id number]                          ROOM:       4468  ACCOUNT NO:   [de-identified]                           ADMIT DATE: 2019  PROVIDER:     Eugene Graham MD    DATE OF PROCEDURE:  2019    PREOPERATIVE DIAGNOSIS:  Acute appendicitis, possible spigelian hernia. POSTOPERATIVE DIAGNOSIS:  Acute appendicitis, possible spigelian hernia. PROCEDURE:  Laparoscopic appendectomy. SURGEON:  Eugene Graham MD    ANESTHESIA:  General endotracheal.    ESTIMATED BLOOD LOSS:  Minimal.    COMPLICATIONS:  None. SPECIMEN:  Appendix. OPERATIVE INDICATIONS AND CONSENT:  The patient is 68-year-old male who  recently presented to the office with complaints of right-sided  abdominal pain. He had a bulge in the right lower abdomen. The concern  was for a spigelian hernia. He had a CT scan of the abdomen and pelvis  as an outpatient yesterday evening, which showed acute appendicitis. He  is subsequently admitted to the hospital for IV antibiotics and  laparoscopic appendectomy. The plan is for laparoscopic appendectomy. We will evaluate for the possibility of a spigelian hernia. We also  planned to potentially proceed with spigelian hernia repair if it did  not require mesh placement. DETAILS OF THE PROCEDURE:  The patient was brought to the operative  suite and placed in a supine position on the operative table. After  general endotracheal anesthesia, he was prepped and draped in the usual  sterile fashion. We made a 5-mm curvilinear incision at the umbilicus. A Veress needle was passed into the peritoneal cavity, and after  adequate insufflation, a 5-mm OptiView trocar was placed at this site.    A 5-mm trocar was placed in the right epigastrium followed by a 5-mm  trocar in the suprapubic location. We evaluated the area of the  possible spigelian hernia. There was no hernia noted. The palpable  abdominal mass was noted. Given the lack of the findings of a hernia  laparoscopically, the subcutaneous mass was felt to be most consistent  with a lipoma. The appendix was easily identified in the right lower quadrant. The tip  was thickened and inflamed consistent with acute appendicitis. The  mesoappendix was divided with the LigaSure device and then the appendix  was cleared circumferentially at its base. The appendix was ligated  with 0 PDS Endoloops x2 proximally and then one distally. The appendix  was then transected with the scissors. Cautery was applied to the  mucosal surface. The appendix was removed with an EndoCatch bag through  the periumbilical trocar site. Trocar was then reinserted. We had  excellent hemostasis at the operative site and excellent security of the  appendiceal closure. The trocars were removed under direct  visualization and the abdomen was de-insufflated. All areas were  injected with 0.5% Marcaine with epinephrine before the skin and the  incisions were closed with running 4-0 subcuticular sutures. Dermabond  was then applied. The patient tolerated the procedure without  difficulty and was transferred to recovery room in stable condition.         Saqib Cherry MD    D: 03/22/2019 17:39:17       T: 03/22/2019 17:41:50     [email or web address]  Job#: [id number]     Doc#: [id number]    CC:

## 2019-03-23 NOTE — PROGRESS NOTES
Margo Calixto is a 77 y.o. male patient. Current Facility-Administered Medications   Medication Dose Route Frequency Provider Last Rate Last Dose    piperacillin-tazobactam (ZOSYN) 3.375 g in dextrose 50 mL IVPB (premix)  3.375 g Intravenous Q8H Ryan Simon MD   Stopped at 03/23/19 0738    morphine (PF) injection 2 mg  2 mg Intravenous Q2H PRN Ryan Simon MD   2 mg at 03/22/19 0324    oxyCODONE-acetaminophen (PERCOCET) 5-325 MG per tablet 1 tablet  1 tablet Oral Q4H PRN Ryan Simon MD   1 tablet at 03/22/19 2127    enoxaparin (LOVENOX) injection 40 mg  40 mg Subcutaneous Nightly Ryan Simon MD   40 mg at 03/22/19 2127    ondansetron (ZOFRAN) injection 4 mg  4 mg Intravenous Q4H PRN Ryan Simon MD        lactated ringers infusion   Intravenous Continuous Ryan Simon  mL/hr at 03/23/19 0654      famotidine (PEPCID) injection 20 mg  20 mg Intravenous BID Ryan Simon MD   20 mg at 03/23/19 4348    hydrALAZINE (APRESOLINE) injection 10 mg  10 mg Intravenous Q6H PRN Ryan Simon MD   10 mg at 03/22/19 2133     No Known Allergies  Active Problems:    Appendicitis, acute  Resolved Problems:    * No resolved hospital problems. *    Blood pressure (!) 145/81, pulse 89, temperature 97.5 °F (36.4 °C), temperature source Oral, resp. rate 16, height 5' 11\" (1.803 m), weight 156 lb (70.8 kg), SpO2 98 %. Subjective:  Symptoms:  Stable. Diet:  Adequate intake. Activity level: Normal.    Pain:  He complains of pain that is mild. Objective:  General Appearance:  Comfortable. Vital signs: (most recent): Blood pressure (!) 145/81, pulse 89, temperature 97.5 °F (36.4 °C), temperature source Oral, resp. rate 16, height 5' 11\" (1.803 m), weight 156 lb (70.8 kg), SpO2 98 %. Output: Producing urine. Lungs:  Normal effort and normal respiratory rate. Abdomen: Abdomen is soft. (Incisions healing well).       Assessment & Plan POD # 1 S/P lap appy. Doing well. Discharge home.      Leticia Verde MD  3/23/2019

## 2019-03-23 NOTE — PROGRESS NOTES
AM assessment complete. Neuro checks WDL. VSS. Lap sites are clean dry and intact with no drainage noted. Patient resting in bed with call light in reach. Patient is independent in the room. Patient has been ambulating in the halls.

## 2019-04-05 ENCOUNTER — OFFICE VISIT (OUTPATIENT)
Dept: SURGERY | Age: 69
End: 2019-04-05

## 2019-04-05 VITALS — SYSTOLIC BLOOD PRESSURE: 160 MMHG | BODY MASS INDEX: 22.32 KG/M2 | DIASTOLIC BLOOD PRESSURE: 94 MMHG | WEIGHT: 160 LBS

## 2019-04-05 DIAGNOSIS — R10.31 RLQ ABDOMINAL PAIN: Primary | ICD-10-CM

## 2019-04-05 PROCEDURE — 99024 POSTOP FOLLOW-UP VISIT: CPT | Performed by: SURGERY

## 2019-04-05 NOTE — PROGRESS NOTES
Subjective:      Patient ID: Rafa Hernandez is a 77 y.o. male. HPI    Review of Systems    Objective:   Physical Exam  Abdomen soft  Incision healing well    Assessment:      78-year-old male status post laparoscopic appendectomy for acute appendicitis. Doing well postoperatively. Plan:      Follow-up as needed.         Lucie Cummings MD

## 2019-04-05 NOTE — LETTER
[address]  [address]  [facility] [address] [address]  Phone: [phone]  Fax: [phone]    Jose F Pulse        April 5, 2019       Patient: Maryann Espino   MR Number: [id number]   YOB: 1950   Date of Visit: 4/5/2019       Dear Dr. Mariya Millard: Thank you for the request for consultation for Amina Holly. Below are the relevant portions of my assessment and plan of care. Assessment:     33-year-old male status post laparoscopic appendectomy for acute appendicitis. Doing well postoperatively. Plan:     Follow-up as needed. If you have questions, please do not hesitate to call me.      Sincerely,        Mitch Rosenberg MD    CC providers:  JODY Cabrales

## 2021-01-12 ENCOUNTER — NURSE TRIAGE (OUTPATIENT)
Dept: OTHER | Facility: CLINIC | Age: 71
End: 2021-01-12

## 2021-01-12 NOTE — TELEPHONE ENCOUNTER
Reason for Disposition   Systolic BP >= 349 OR Diastolic >= 739, and any cardiac or neurologic symptoms (e.g., chest pain, difficulty breathing, unsteady gait, blurred vision)    Answer Assessment - Initial Assessment Questions  1. BLOOD PRESSURE: \"What is the blood pressure? \" \"Did you take at least two measurements 5 minutes apart?\"      185/95    2. ONSET: \"When did you take your blood pressure? \"      Today in the eye clinic    3. HOW: \"How did you obtain the blood pressure? \" (e.g., visiting nurse, automatic home BP monitor)      Automatic monitor at office    4. HISTORY: \"Do you have a history of high blood pressure? \"      Yes    5. MEDICATIONS: Maylin Chamberlain you taking any medications for blood pressure? \" \"Have you missed any doses recently? \"      No    6. OTHER SYMPTOMS: \"Do you have any symptoms? \" (e.g., headache, chest pain, blurred vision, difficulty breathing, weakness)      Blurred vision    7. PREGNANCY: \"Is there any chance you are pregnant? \" \"When was your last menstrual period? \"      No    Protocols used: HIGH BLOOD PRESSURE-ADULT-OH    Patient called pre-service center [facility] with red flag complaint. Warm transfer from [facility]    Brief description of triage: elevated B/P    Triage indicates for patient to [address] or PCP with approval.  Prefers PCP. 2nd level triage completed with Rubina Serrato NP; provider recommends patient be seen by PCP    Care advice provided, patient verbalizes understanding; denies any other questions or concerns; instructed to call back for any new or worsening symptoms. Writer provided warm transfer to Monica Junior at [place] for appointment scheduling. Attention Provider: Thank you for allowing me to participate in the care of your patient. The patient was connected to triage in response to information provided to the [facility]. Please do not respond through this encounter as the response is not directed to a shared pool.

## 2021-01-13 ENCOUNTER — OFFICE VISIT (OUTPATIENT)
Dept: PRIMARY CARE CLINIC | Age: 71
End: 2021-01-13
Payer: MEDICARE

## 2021-01-13 VITALS
SYSTOLIC BLOOD PRESSURE: 180 MMHG | TEMPERATURE: 98.4 F | HEART RATE: 82 BPM | HEIGHT: 71 IN | BODY MASS INDEX: 22.68 KG/M2 | DIASTOLIC BLOOD PRESSURE: 100 MMHG | WEIGHT: 162 LBS | OXYGEN SATURATION: 98 %

## 2021-01-13 DIAGNOSIS — I10 UNCONTROLLED HYPERTENSION: ICD-10-CM

## 2021-01-13 DIAGNOSIS — Z00.00 ROUTINE GENERAL MEDICAL EXAMINATION AT A HEALTH CARE FACILITY: Primary | ICD-10-CM

## 2021-01-13 DIAGNOSIS — Z12.11 SCREEN FOR COLON CANCER: ICD-10-CM

## 2021-01-13 DIAGNOSIS — Z11.59 NEED FOR HEPATITIS C SCREENING TEST: ICD-10-CM

## 2021-01-13 DIAGNOSIS — R39.12 WEAK URINARY STREAM: ICD-10-CM

## 2021-01-13 PROCEDURE — G8484 FLU IMMUNIZE NO ADMIN: HCPCS | Performed by: STUDENT IN AN ORGANIZED HEALTH CARE EDUCATION/TRAINING PROGRAM

## 2021-01-13 PROCEDURE — G0438 PPPS, INITIAL VISIT: HCPCS | Performed by: STUDENT IN AN ORGANIZED HEALTH CARE EDUCATION/TRAINING PROGRAM

## 2021-01-13 PROCEDURE — 1123F ACP DISCUSS/DSCN MKR DOCD: CPT | Performed by: STUDENT IN AN ORGANIZED HEALTH CARE EDUCATION/TRAINING PROGRAM

## 2021-01-13 PROCEDURE — 3017F COLORECTAL CA SCREEN DOC REV: CPT | Performed by: STUDENT IN AN ORGANIZED HEALTH CARE EDUCATION/TRAINING PROGRAM

## 2021-01-13 PROCEDURE — 4040F PNEUMOC VAC/ADMIN/RCVD: CPT | Performed by: STUDENT IN AN ORGANIZED HEALTH CARE EDUCATION/TRAINING PROGRAM

## 2021-01-13 RX ORDER — LISINOPRIL 10 MG/1
10 TABLET ORAL DAILY
Qty: 30 TABLET | Refills: 1 | Status: SHIPPED | OUTPATIENT
Start: 2021-01-13 | End: 2021-03-10

## 2021-01-13 RX ORDER — TAMSULOSIN HYDROCHLORIDE 0.4 MG/1
0.4 CAPSULE ORAL DAILY
Qty: 30 CAPSULE | Refills: 1 | Status: SHIPPED | OUTPATIENT
Start: 2021-01-13 | End: 2021-03-10

## 2021-01-13 RX ORDER — BLOOD PRESSURE TEST KIT
1 KIT MISCELLANEOUS DAILY
Qty: 1 KIT | Refills: 0 | Status: SHIPPED | OUTPATIENT
Start: 2021-01-13 | End: 2021-05-12

## 2021-01-13 SDOH — ECONOMIC STABILITY: TRANSPORTATION INSECURITY
IN THE PAST 12 MONTHS, HAS LACK OF TRANSPORTATION KEPT YOU FROM MEETINGS, WORK, OR FROM GETTING THINGS NEEDED FOR DAILY LIVING?: NO

## 2021-01-13 SDOH — ECONOMIC STABILITY: TRANSPORTATION INSECURITY
IN THE PAST 12 MONTHS, HAS THE LACK OF TRANSPORTATION KEPT YOU FROM MEDICAL APPOINTMENTS OR FROM GETTING MEDICATIONS?: NO

## 2021-01-13 SDOH — ECONOMIC STABILITY: INCOME INSECURITY: HOW HARD IS IT FOR YOU TO PAY FOR THE VERY BASICS LIKE FOOD, HOUSING, MEDICAL CARE, AND HEATING?: NOT HARD AT ALL

## 2021-01-13 ASSESSMENT — PATIENT HEALTH QUESTIONNAIRE - PHQ9
SUM OF ALL RESPONSES TO PHQ9 QUESTIONS 1 & 2: 0
2. FEELING DOWN, DEPRESSED OR HOPELESS: 0
1. LITTLE INTEREST OR PLEASURE IN DOING THINGS: 0

## 2021-01-13 NOTE — PROGRESS NOTES
(Family Medicine)  Meggan Beauchamp MD as Consulting Physician (General Surgery)    Wt Readings from Last 3 Encounters:   01/13/21 162 lb (73.5 kg)   04/05/19 160 lb (72.6 kg)   03/22/19 156 lb (70.8 kg)     Vitals:    01/13/21 1032 01/13/21 1046   BP: (!) 160/100 (!) 180/100   Site: Right Upper Arm Right Upper Arm   Position: Sitting Sitting   Cuff Size: Medium Adult Medium Adult   Pulse: 85 82   Temp: 98.4 °F (36.9 °C) 98.4 °F (36.9 °C)   TempSrc: Infrared Infrared   SpO2: 98% 98%   Weight: 162 lb 3.2 oz (73.6 kg) 162 lb (73.5 kg)   Height: 5' 10.8\" (1.798 m) 5' 10.8\" (1.798 m)     Body mass index is 22.72 kg/m². Based upon direct observation of the patient, evaluation of cognition reveals recent and remote memory intact.     General Appearance: alert and oriented to person, place and time, well developed and well- nourished, in no acute distress  Skin: warm and dry, no rash or erythema  Head: normocephalic and atraumatic  Eyes: pupils equal, round, and reactive to light, extraocular eye movements intact, conjunctivae normal  ENT: tympanic membrane, external ear and ear canal normal bilaterally, nose without deformity, nasal mucosa and turbinates normal without polyps  Neck: supple and non-tender without mass, no thyromegaly or thyroid nodules, no cervical lymphadenopathy  Pulmonary/Chest: clear to auscultation bilaterally- no wheezes, rales or rhonchi, normal air movement, no respiratory distress  Cardiovascular: normal rate, regular rhythm, normal S1 and S2, no murmurs, rubs, clicks, or gallops, distal pulses intact, no carotid bruits  Abdomen: soft, non-tender, non-distended, normal bowel sounds, no masses or organomegaly  Extremities: no cyanosis, clubbing or edema  Musculoskeletal: normal range of motion, no joint swelling, deformity or tenderness  Neurologic: reflexes normal and symmetric, no cranial nerve deficit, gait, coordination and speech normal    Patient's complete Health Risk Assessment and screening values have been reviewed and are found in Flowsheets. The following problems were reviewed today and where indicated follow up appointments were made and/or referrals ordered. Positive Risk Factor Screenings with Interventions:            General Health and ACP:  General  In general, how would you say your health is?: Good  In the past 7 days, have you experienced any of the following? New or Increased Pain, New or Increased Fatigue, Loneliness, Social Isolation, Stress or Anger?: (!) New or Increased Pain  Do you get the social and emotional support that you need?: Yes  Do you have a Living Will?: (!) No  Advance Directives     Power of  Living Will ACP-Advance Directive ACP-Power of     Not on File Not on File Not on File Not on File      General Health Risk Interventions:  · none    Health Habits/Nutrition:  Health Habits/Nutrition  Do you exercise for at least 20 minutes 2-3 times per week?: Yes  Have you lost any weight without trying in the past 3 months?: (!) Yes  Do you eat fewer than 2 meals per day?: (!) Yes  Have you seen a dentist within the past year?: (!) No  Body mass index: 22.72  Health Habits/Nutrition Interventions:  · Nutritional issues:  patient advised to follow-up in this office for further evaluation and treatment within 1 month(s)     Safety:  Safety  Do you have working smoke detectors?: Yes  Have all throw rugs been removed or fastened?: Yes  Do you have non-slip mats or surfaces in all bathtubs/showers?: (!) No  Do all of your stairways have a railing or banister?: Yes  Are your doorways, halls and stairs free of clutter?: Yes  Do you always fasten your seatbelt when you are in a car?: (!) No  Safety Interventions:  · Home safety tips provided     Personalized Preventive Plan   Current Health Maintenance Status    There is no immunization history on file for this patient.      Health Maintenance   Topic Date Due    Hepatitis C screen  1950    Lipid screen  09/15/1988    Colon cancer screen colonoscopy  09/15/1998    Annual Wellness Visit (AWV)  06/23/2019    Potassium monitoring  03/21/2020    Creatinine monitoring  03/21/2020    Pneumococcal 65+ years Vaccine (1 of 1 - PPSV23) 02/28/2021 (Originally 9/15/2013)    DTaP/Tdap/Td vaccine (1 - Tdap) 01/13/2022 (Originally 9/15/1967)    Flu vaccine (1) 01/13/2022 (Originally 9/1/2020)    Shingles Vaccine (1 of 2) 01/13/2022 (Originally 9/15/1998)    Hepatitis A vaccine  Aged Out    Hepatitis B vaccine  Aged Out    Hib vaccine  Aged Out    Meningococcal (ACWY) vaccine  Aged Out     Recommendations for [employer] Due: see orders and patient instructions/AVS.  . Recommended screening schedule for the next 5-10 years is provided to the patient in written form: see Patient Instructions/AVS.    Tawana Davis was seen today for medicare awv. Diagnoses and all orders for this visit:    Routine general medical examination at a health care facility    Uncontrolled hypertension  -     MICROSCOPIC URINALYSIS; Future  -     COMPREHENSIVE METABOLIC PANEL; Future  -     Lipid Panel; Future  -     Blood Pressure KIT; 1 kit by Does not apply route daily  -     lisinopril (PRINIVIL;ZESTRIL) 10 MG tablet; Take 1 tablet by mouth daily    Screen for colon cancer  -     Cologuard; Future    Weak urinary stream  -     Cancel: SINAI Pak MD, [facility] [place]  -     tamsulosin (FLOMAX) 0.4 MG capsule; Take 1 capsule by mouth daily  -     SINAI Pak MD, [facility] [place]    Need for hepatitis C screening test  -     HEPATITIS C ANTIBODY;  Future             Follow up in 1 month for HTN

## 2021-01-13 NOTE — PATIENT INSTRUCTIONS
High Blood Pressure  - start lisinopril 10 mg daily  - keep blood pressure diary, check every day and write results down and we will follow up in one month. Weak Urinary stream  -  Start flomax 0.4 mg daily  - decrease fluid intake a couple hours before bedtime    Colon Cancer Screening  - a kit will be mailed to you, just follow directions on package. - if you do not receive your kit within one month please call and let us know. Personalized Preventive Plan for Misbah Mejía - 1/13/2021  Medicare offers a range of preventive health benefits. Some of the tests and screenings are paid in full while other may be subject to a deductible, co-insurance, and/or copay. Some of these benefits include a comprehensive review of your medical history including lifestyle, illnesses that may run in your family, and various assessments and screenings as appropriate. After reviewing your medical record and screening and assessments performed today your provider may have ordered immunizations, labs, imaging, and/or referrals for you. A list of these orders (if applicable) as well as your Preventive Care list are included within your After Visit Summary for your review. Other Preventive Recommendations:    · A preventive eye exam performed by an eye specialist is recommended every 1-2 years to screen for glaucoma; cataracts, macular degeneration, and other eye disorders. · A preventive dental visit is recommended every 6 months. · Try to get at least 150 minutes of exercise per week or 10,000 steps per day on a pedometer . · Order or download the FREE \"Exercise & Physical Activity: Your Everyday Guide\" from The [employer]. Call 6-192.788.3411 or search The [employer] online. · You need 6688-8645 mg of calcium and 2943-0264 IU of vitamin D per day.  It is possible to meet your calcium requirement with diet alone, but a vitamin D supplement is usually necessary to meet this goal.  · When exposed to the sun, use a sunscreen that protects against both UVA and UVB radiation with an SPF of 30 or greater. Reapply every 2 to 3 hours or after sweating, drying off with a towel, or swimming. · Always wear a seat belt when traveling in a car. Always wear a helmet when riding a bicycle or motorcycle. Personalized Preventive Plan for Bijal Lagos - 1/13/2021  Medicare offers a range of preventive health benefits. Some of the tests and screenings are paid in full while other may be subject to a deductible, co-insurance, and/or copay. Some of these benefits include a comprehensive review of your medical history including lifestyle, illnesses that may run in your family, and various assessments and screenings as appropriate. After reviewing your medical record and screening and assessments performed today your provider may have ordered immunizations, labs, imaging, and/or referrals for you. A list of these orders (if applicable) as well as your Preventive Care list are included within your After Visit Summary for your review. Other Preventive Recommendations:    A preventive eye exam performed by an eye specialist is recommended every 1-2 years to screen for glaucoma; cataracts, macular degeneration, and other eye disorders. A preventive dental visit is recommended every 6 months. Try to get at least 150 minutes of exercise per week or 10,000 steps per day on a pedometer . Order or download the FREE \"Exercise & Physical Activity: Your Everyday Guide\" from The [employer]. Call 0-518.792.3682 or search The [employer] online. You need 9689-7753 mg of calcium and 5164-1007 IU of vitamin D per day. It is possible to meet your calcium requirement with diet alone, but a vitamin D supplement is usually necessary to meet this goal.  When exposed to the sun, use a sunscreen that protects against both UVA and UVB radiation with an SPF of 30 or greater.  Reapply every 2 to 3 hours or after sweating, drying off with a towel, or swimming. Always wear a seat belt when traveling in a car. Always wear a helmet when riding a bicycle or motorcycle.

## 2021-01-14 LAB
A/G RATIO: 2 (ref 1.1–2.2)
ALBUMIN SERPL-MCNC: 4.5 G/DL (ref 3.4–5)
ALP BLD-CCNC: 59 U/L (ref 40–129)
ALT SERPL-CCNC: 9 U/L (ref 10–40)
ANION GAP SERPL CALCULATED.3IONS-SCNC: 9 MMOL/L (ref 3–16)
AST SERPL-CCNC: 15 U/L (ref 15–37)
BILIRUB SERPL-MCNC: 0.4 MG/DL (ref 0–1)
BUN BLDV-MCNC: 18 MG/DL (ref 7–20)
CALCIUM SERPL-MCNC: 9 MG/DL (ref 8.3–10.6)
CHLORIDE BLD-SCNC: 106 MMOL/L (ref 99–110)
CHOLESTEROL, TOTAL: 177 MG/DL (ref 0–199)
CO2: 28 MMOL/L (ref 21–32)
CREAT SERPL-MCNC: 0.8 MG/DL (ref 0.8–1.3)
EPITHELIAL CELLS, UA: 1 /HPF (ref 0–5)
GFR AFRICAN AMERICAN: >60
GFR NON-AFRICAN AMERICAN: >60
GLOBULIN: 2.2 G/DL
GLUCOSE BLD-MCNC: 94 MG/DL (ref 70–99)
HDLC SERPL-MCNC: 47 MG/DL (ref 40–60)
HEPATITIS C ANTIBODY INTERPRETATION: NORMAL
HYALINE CASTS: 0 /LPF (ref 0–8)
LDL CHOLESTEROL CALCULATED: 111 MG/DL
POTASSIUM SERPL-SCNC: 4.2 MMOL/L (ref 3.5–5.1)
RBC UA: 6 /HPF (ref 0–4)
SODIUM BLD-SCNC: 143 MMOL/L (ref 136–145)
TOTAL PROTEIN: 6.7 G/DL (ref 6.4–8.2)
TRIGL SERPL-MCNC: 96 MG/DL (ref 0–150)
URINE TYPE: ABNORMAL
VLDLC SERPL CALC-MCNC: 19 MG/DL
WBC UA: 1 /HPF (ref 0–5)

## 2021-03-10 DIAGNOSIS — I10 UNCONTROLLED HYPERTENSION: ICD-10-CM

## 2021-03-10 DIAGNOSIS — R39.12 WEAK URINARY STREAM: ICD-10-CM

## 2021-03-10 RX ORDER — TAMSULOSIN HYDROCHLORIDE 0.4 MG/1
CAPSULE ORAL
Qty: 30 CAPSULE | Refills: 1 | Status: SHIPPED | OUTPATIENT
Start: 2021-03-10 | End: 2021-03-16 | Stop reason: SDUPTHER

## 2021-03-10 RX ORDER — LISINOPRIL 10 MG/1
TABLET ORAL
Qty: 30 TABLET | Refills: 1 | Status: SHIPPED | OUTPATIENT
Start: 2021-03-10 | End: 2021-03-16

## 2021-03-16 ENCOUNTER — OFFICE VISIT (OUTPATIENT)
Dept: PRIMARY CARE CLINIC | Age: 71
End: 2021-03-16
Payer: MEDICARE

## 2021-03-16 VITALS
BODY MASS INDEX: 22.68 KG/M2 | OXYGEN SATURATION: 98 % | WEIGHT: 162 LBS | DIASTOLIC BLOOD PRESSURE: 90 MMHG | TEMPERATURE: 97.8 F | HEART RATE: 75 BPM | HEIGHT: 71 IN | SYSTOLIC BLOOD PRESSURE: 160 MMHG

## 2021-03-16 DIAGNOSIS — N40.0 BENIGN PROSTATIC HYPERPLASIA, UNSPECIFIED WHETHER LOWER URINARY TRACT SYMPTOMS PRESENT: ICD-10-CM

## 2021-03-16 DIAGNOSIS — R01.1 SYSTOLIC MURMUR: ICD-10-CM

## 2021-03-16 DIAGNOSIS — I10 UNCONTROLLED HYPERTENSION: Primary | ICD-10-CM

## 2021-03-16 PROCEDURE — 99213 OFFICE O/P EST LOW 20 MIN: CPT | Performed by: STUDENT IN AN ORGANIZED HEALTH CARE EDUCATION/TRAINING PROGRAM

## 2021-03-16 PROCEDURE — 1036F TOBACCO NON-USER: CPT | Performed by: STUDENT IN AN ORGANIZED HEALTH CARE EDUCATION/TRAINING PROGRAM

## 2021-03-16 PROCEDURE — G8484 FLU IMMUNIZE NO ADMIN: HCPCS | Performed by: STUDENT IN AN ORGANIZED HEALTH CARE EDUCATION/TRAINING PROGRAM

## 2021-03-16 PROCEDURE — G8427 DOCREV CUR MEDS BY ELIG CLIN: HCPCS | Performed by: STUDENT IN AN ORGANIZED HEALTH CARE EDUCATION/TRAINING PROGRAM

## 2021-03-16 PROCEDURE — 4040F PNEUMOC VAC/ADMIN/RCVD: CPT | Performed by: STUDENT IN AN ORGANIZED HEALTH CARE EDUCATION/TRAINING PROGRAM

## 2021-03-16 PROCEDURE — 3017F COLORECTAL CA SCREEN DOC REV: CPT | Performed by: STUDENT IN AN ORGANIZED HEALTH CARE EDUCATION/TRAINING PROGRAM

## 2021-03-16 PROCEDURE — 1123F ACP DISCUSS/DSCN MKR DOCD: CPT | Performed by: STUDENT IN AN ORGANIZED HEALTH CARE EDUCATION/TRAINING PROGRAM

## 2021-03-16 PROCEDURE — G8420 CALC BMI NORM PARAMETERS: HCPCS | Performed by: STUDENT IN AN ORGANIZED HEALTH CARE EDUCATION/TRAINING PROGRAM

## 2021-03-16 RX ORDER — TAMSULOSIN HYDROCHLORIDE 0.4 MG/1
CAPSULE ORAL
Qty: 90 CAPSULE | Refills: 0 | Status: SHIPPED | OUTPATIENT
Start: 2021-03-16 | End: 2021-05-12 | Stop reason: SDUPTHER

## 2021-03-16 RX ORDER — LISINOPRIL 20 MG/1
20 TABLET ORAL DAILY
Qty: 30 TABLET | Refills: 0 | Status: SHIPPED | OUTPATIENT
Start: 2021-03-16 | End: 2021-04-07

## 2021-03-16 NOTE — PROGRESS NOTES
him doing any heavy exerting himself until we check out his heart, light exercise and walking ok. Pt will make changes to diet and start walking and wants to follow up in one month if at that time he still has some lightheadedness and murmur then will proceed with ekg and echo. Medication: increase to 20 mg daily lisinopril. Dietary sodium restriction. Regular aerobic exercise. Check blood pressures daily and record. Follow up: 1 month and as needed.

## 2021-04-07 RX ORDER — LISINOPRIL 20 MG/1
TABLET ORAL
Qty: 30 TABLET | Refills: 0 | Status: SHIPPED | OUTPATIENT
Start: 2021-04-07 | End: 2021-05-12

## 2021-04-07 NOTE — TELEPHONE ENCOUNTER
Medication:   Requested Prescriptions     Pending Prescriptions Disp Refills    lisinopril (PRINIVIL;ZESTRIL) 20 MG tablet [Pharmacy Med Name: LISINOPRIL 20 MG TABLET] 30 tablet 0     Sig: TAKE 1 TABLET BY MOUTH EVERY DAY     Last Filled:  3/16/21    Last appt: 4/6/2021   Next appt: Visit date not found    Last Labs DM: No results found for: LABA1C  Last Lipid:   Lab Results   Component Value Date    CHOL 177 01/13/2021    TRIG 96 01/13/2021    HDL 47 01/13/2021    LDLCALC 111 01/13/2021     Last PSA: No results found for: PSA  Last Thyroid: No results found for: TSH, FT3, P6EJDNG, T4FREE, H9EFNAJ

## 2021-04-07 NOTE — TELEPHONE ENCOUNTER
Patient needs a follow-up appointment for his blood pressure before I can give any more refills, his blood pressure was not well controlled last visit.

## 2021-04-14 ENCOUNTER — OFFICE VISIT (OUTPATIENT)
Dept: PRIMARY CARE CLINIC | Age: 71
End: 2021-04-14
Payer: MEDICARE

## 2021-04-14 VITALS
HEART RATE: 63 BPM | TEMPERATURE: 98 F | SYSTOLIC BLOOD PRESSURE: 150 MMHG | BODY MASS INDEX: 22.39 KG/M2 | DIASTOLIC BLOOD PRESSURE: 98 MMHG | WEIGHT: 159.6 LBS | OXYGEN SATURATION: 97 %

## 2021-04-14 DIAGNOSIS — I10 UNCONTROLLED HYPERTENSION: Primary | ICD-10-CM

## 2021-04-14 PROCEDURE — 1123F ACP DISCUSS/DSCN MKR DOCD: CPT | Performed by: STUDENT IN AN ORGANIZED HEALTH CARE EDUCATION/TRAINING PROGRAM

## 2021-04-14 PROCEDURE — 1036F TOBACCO NON-USER: CPT | Performed by: STUDENT IN AN ORGANIZED HEALTH CARE EDUCATION/TRAINING PROGRAM

## 2021-04-14 PROCEDURE — 4040F PNEUMOC VAC/ADMIN/RCVD: CPT | Performed by: STUDENT IN AN ORGANIZED HEALTH CARE EDUCATION/TRAINING PROGRAM

## 2021-04-14 PROCEDURE — 99214 OFFICE O/P EST MOD 30 MIN: CPT | Performed by: STUDENT IN AN ORGANIZED HEALTH CARE EDUCATION/TRAINING PROGRAM

## 2021-04-14 PROCEDURE — 3017F COLORECTAL CA SCREEN DOC REV: CPT | Performed by: STUDENT IN AN ORGANIZED HEALTH CARE EDUCATION/TRAINING PROGRAM

## 2021-04-14 PROCEDURE — G8420 CALC BMI NORM PARAMETERS: HCPCS | Performed by: STUDENT IN AN ORGANIZED HEALTH CARE EDUCATION/TRAINING PROGRAM

## 2021-04-14 PROCEDURE — G8427 DOCREV CUR MEDS BY ELIG CLIN: HCPCS | Performed by: STUDENT IN AN ORGANIZED HEALTH CARE EDUCATION/TRAINING PROGRAM

## 2021-04-14 RX ORDER — CHLORTHALIDONE 25 MG/1
25 TABLET ORAL DAILY
Qty: 30 TABLET | Refills: 1 | Status: SHIPPED | OUTPATIENT
Start: 2021-04-14 | Stop reason: SDUPTHER

## 2021-04-14 RX ORDER — BLOOD PRESSURE TEST KIT
1 KIT MISCELLANEOUS DAILY
Qty: 1 KIT | Refills: 0 | Status: SHIPPED | OUTPATIENT
Start: 2021-04-14

## 2021-04-14 ASSESSMENT — ENCOUNTER SYMPTOMS
RHINORRHEA: 0
SHORTNESS OF BREATH: 0
DIARRHEA: 0
COUGH: 0
CONSTIPATION: 0
VOMITING: 0
SORE THROAT: 0
NAUSEA: 0

## 2021-04-14 NOTE — PROGRESS NOTES
want to do any stress testing or echo because he feels fine        Review of Systems   Constitutional: Negative for chills, fever and malaise/fatigue. HENT: Negative for congestion, rhinorrhea and sore throat. Eyes: Negative for visual disturbance. Respiratory: Negative for cough and shortness of breath. Cardiovascular: Negative for chest pain and palpitations. Gastrointestinal: Negative for constipation, diarrhea, nausea and vomiting. Endocrine: Negative for polydipsia and polyuria. Genitourinary: Negative for dysuria. Musculoskeletal: Negative for arthralgias. Skin: Negative for rash. Allergic/Immunologic: Negative for environmental allergies. Neurological: Negative for headaches. Psychiatric/Behavioral: The patient is not nervous/anxious. Physical Exam  Vitals signs reviewed. Constitutional:       General: He is not in acute distress. Appearance: Normal appearance. He is not ill-appearing, toxic-appearing or diaphoretic. HENT:      Head: Normocephalic and atraumatic. Right Ear: External ear normal.      Left Ear: External ear normal.      Nose: Nose normal.      Mouth/Throat:      Mouth: Mucous membranes are moist.   Eyes:      Extraocular Movements: Extraocular movements intact. Neck:      Musculoskeletal: Normal range of motion. Cardiovascular:      Rate and Rhythm: Normal rate and regular rhythm. Heart sounds: Normal heart sounds. No murmur. No friction rub. No gallop. Pulmonary:      Effort: Pulmonary effort is normal.      Breath sounds: Normal breath sounds. No wheezing, rhonchi or rales. Skin:     General: Skin is warm. Neurological:      General: No focal deficit present. Mental Status: He is alert. Psychiatric:         Mood and Affect: Mood normal.                 An electronic signature was used to authenticate this note.     --Fani Morgan MD

## 2021-04-21 ENCOUNTER — NURSE ONLY (OUTPATIENT)
Dept: PRIMARY CARE CLINIC | Age: 71
End: 2021-04-21

## 2021-04-21 VITALS — DIASTOLIC BLOOD PRESSURE: 108 MMHG | SYSTOLIC BLOOD PRESSURE: 142 MMHG

## 2021-04-27 ENCOUNTER — TELEPHONE (OUTPATIENT)
Dept: PRIMARY CARE CLINIC | Age: 71
End: 2021-04-27

## 2021-05-07 DIAGNOSIS — I10 UNCONTROLLED HYPERTENSION: ICD-10-CM

## 2021-05-07 RX ORDER — CHLORTHALIDONE 25 MG/1
TABLET ORAL
Qty: 30 TABLET | Refills: 1 | Status: SHIPPED | OUTPATIENT
Start: 2021-05-07 | End: 2021-05-12 | Stop reason: SDUPTHER

## 2021-05-12 ENCOUNTER — OFFICE VISIT (OUTPATIENT)
Dept: PRIMARY CARE CLINIC | Age: 71
End: 2021-05-12
Payer: MEDICARE

## 2021-05-12 VITALS
TEMPERATURE: 98.4 F | OXYGEN SATURATION: 100 % | BODY MASS INDEX: 22.43 KG/M2 | WEIGHT: 159.9 LBS | DIASTOLIC BLOOD PRESSURE: 92 MMHG | SYSTOLIC BLOOD PRESSURE: 162 MMHG | HEART RATE: 48 BPM

## 2021-05-12 DIAGNOSIS — I10 UNCONTROLLED HYPERTENSION: Primary | ICD-10-CM

## 2021-05-12 DIAGNOSIS — N40.0 BENIGN PROSTATIC HYPERPLASIA, UNSPECIFIED WHETHER LOWER URINARY TRACT SYMPTOMS PRESENT: ICD-10-CM

## 2021-05-12 PROCEDURE — G8420 CALC BMI NORM PARAMETERS: HCPCS | Performed by: STUDENT IN AN ORGANIZED HEALTH CARE EDUCATION/TRAINING PROGRAM

## 2021-05-12 PROCEDURE — 1123F ACP DISCUSS/DSCN MKR DOCD: CPT | Performed by: STUDENT IN AN ORGANIZED HEALTH CARE EDUCATION/TRAINING PROGRAM

## 2021-05-12 PROCEDURE — 4040F PNEUMOC VAC/ADMIN/RCVD: CPT | Performed by: STUDENT IN AN ORGANIZED HEALTH CARE EDUCATION/TRAINING PROGRAM

## 2021-05-12 PROCEDURE — G8427 DOCREV CUR MEDS BY ELIG CLIN: HCPCS | Performed by: STUDENT IN AN ORGANIZED HEALTH CARE EDUCATION/TRAINING PROGRAM

## 2021-05-12 PROCEDURE — 1036F TOBACCO NON-USER: CPT | Performed by: STUDENT IN AN ORGANIZED HEALTH CARE EDUCATION/TRAINING PROGRAM

## 2021-05-12 PROCEDURE — 3017F COLORECTAL CA SCREEN DOC REV: CPT | Performed by: STUDENT IN AN ORGANIZED HEALTH CARE EDUCATION/TRAINING PROGRAM

## 2021-05-12 PROCEDURE — 99214 OFFICE O/P EST MOD 30 MIN: CPT | Performed by: STUDENT IN AN ORGANIZED HEALTH CARE EDUCATION/TRAINING PROGRAM

## 2021-05-12 RX ORDER — TAMSULOSIN HYDROCHLORIDE 0.4 MG/1
CAPSULE ORAL
Qty: 90 CAPSULE | Refills: 2 | Status: SHIPPED | OUTPATIENT
Start: 2021-05-12 | End: 2022-05-16

## 2021-05-12 RX ORDER — CHLORTHALIDONE 25 MG/1
TABLET ORAL
Qty: 30 TABLET | Refills: 1 | Status: SHIPPED | OUTPATIENT
Start: 2021-05-12 | End: 2021-06-03

## 2021-05-12 RX ORDER — LISINOPRIL 5 MG/1
5 TABLET ORAL DAILY
Qty: 30 TABLET | Refills: 1 | Status: SHIPPED | OUTPATIENT
Start: 2021-05-12 | End: 2021-06-03

## 2021-05-12 ASSESSMENT — ENCOUNTER SYMPTOMS
COUGH: 0
VOMITING: 0
SHORTNESS OF BREATH: 0
DIARRHEA: 0
CONSTIPATION: 0
NAUSEA: 0
SORE THROAT: 0
RHINORRHEA: 0

## 2021-05-12 NOTE — PROGRESS NOTES
Cardiovascular: Negative for chest pain. Gastrointestinal: Negative for constipation, diarrhea, nausea and vomiting. Endocrine: Negative for polydipsia and polyuria. Genitourinary: Negative for dysuria. Musculoskeletal: Negative for arthralgias. Skin: Negative for rash. Allergic/Immunologic: Negative for environmental allergies. Neurological: Negative for headaches. Psychiatric/Behavioral: The patient is not nervous/anxious. Physical Exam  Vitals signs reviewed. Constitutional:       General: He is not in acute distress. Appearance: Normal appearance. He is not ill-appearing, toxic-appearing or diaphoretic. HENT:      Head: Normocephalic and atraumatic. Right Ear: External ear normal.      Left Ear: External ear normal.      Nose: Nose normal.      Mouth/Throat:      Mouth: Mucous membranes are moist.   Eyes:      Extraocular Movements: Extraocular movements intact. Neck:      Musculoskeletal: Normal range of motion. Cardiovascular:      Rate and Rhythm: Normal rate and regular rhythm. Heart sounds: Normal heart sounds. No murmur. No friction rub. No gallop. Pulmonary:      Effort: Pulmonary effort is normal.      Breath sounds: Normal breath sounds. No wheezing, rhonchi or rales. Skin:     General: Skin is warm. Neurological:      General: No focal deficit present. Mental Status: He is alert. Psychiatric:         Mood and Affect: Mood normal.                 An electronic signature was used to authenticate this note.     --Pascale Hitchcock MD

## 2021-06-03 DIAGNOSIS — I10 UNCONTROLLED HYPERTENSION: ICD-10-CM

## 2021-06-03 RX ORDER — LISINOPRIL 5 MG/1
TABLET ORAL
Qty: 30 TABLET | Refills: 1 | Status: SHIPPED | OUTPATIENT
Start: 2021-06-03 | End: 2021-07-06

## 2021-06-03 RX ORDER — CHLORTHALIDONE 25 MG/1
TABLET ORAL
Qty: 30 TABLET | Refills: 1 | Status: SHIPPED | OUTPATIENT
Start: 2021-06-03 | End: 2021-07-06

## 2021-06-03 NOTE — TELEPHONE ENCOUNTER
Medication:   Requested Prescriptions     Pending Prescriptions Disp Refills    lisinopril (PRINIVIL;ZESTRIL) 5 MG tablet [Pharmacy Med Name: LISINOPRIL 5 MG TABLET] 30 tablet 1     Sig: TAKE 1 TABLET BY MOUTH EVERY DAY     Last Filled:  05/12/21    Last appt: 5/12/2021   Next appt: Visit date not found    Last OARRS: No flowsheet data found.

## 2021-07-06 DIAGNOSIS — I10 UNCONTROLLED HYPERTENSION: ICD-10-CM

## 2021-07-06 RX ORDER — LISINOPRIL 5 MG/1
TABLET ORAL
Qty: 30 TABLET | Refills: 1 | Status: SHIPPED | OUTPATIENT
Start: 2021-07-06 | End: 2021-07-21 | Stop reason: SDUPTHER

## 2021-07-06 RX ORDER — CHLORTHALIDONE 25 MG/1
TABLET ORAL
Qty: 30 TABLET | Refills: 1 | Status: SHIPPED | OUTPATIENT
Start: 2021-07-06 | End: 2021-07-21 | Stop reason: SDUPTHER

## 2021-07-21 DIAGNOSIS — I10 UNCONTROLLED HYPERTENSION: ICD-10-CM

## 2021-07-21 RX ORDER — LISINOPRIL 5 MG/1
TABLET ORAL
Qty: 90 TABLET | Refills: 0 | Status: SHIPPED | OUTPATIENT
Start: 2021-07-21 | End: 2021-10-25

## 2021-07-21 RX ORDER — CHLORTHALIDONE 25 MG/1
25 TABLET ORAL DAILY
Qty: 90 TABLET | Refills: 0 | Status: SHIPPED | OUTPATIENT
Start: 2021-07-21 | End: 2021-10-18

## 2021-07-21 NOTE — TELEPHONE ENCOUNTER
Medication:   Requested Prescriptions     Pending Prescriptions Disp Refills    lisinopril (PRINIVIL;ZESTRIL) 5 MG tablet 30 tablet 1    chlorthalidone (HYGROTON) 25 MG tablet 30 tablet 1     Last Filled:  7.6.21    Patient requesting 90 day supply    Last appt: 5/12/2021   Next appt: Visit date not found    Last OARRS: No flowsheet data found.

## 2021-08-17 ENCOUNTER — VIRTUAL VISIT (OUTPATIENT)
Dept: PRIMARY CARE CLINIC | Age: 71
End: 2021-08-17
Payer: MEDICARE

## 2021-08-17 ENCOUNTER — NURSE TRIAGE (OUTPATIENT)
Dept: OTHER | Facility: CLINIC | Age: 71
End: 2021-08-17

## 2021-08-17 ENCOUNTER — TELEPHONE (OUTPATIENT)
Dept: PRIMARY CARE CLINIC | Age: 71
End: 2021-08-17

## 2021-08-17 DIAGNOSIS — R53.83 FATIGUE, UNSPECIFIED TYPE: Primary | ICD-10-CM

## 2021-08-17 DIAGNOSIS — B34.9 VIRAL SYNDROME: ICD-10-CM

## 2021-08-17 PROCEDURE — G8427 DOCREV CUR MEDS BY ELIG CLIN: HCPCS | Performed by: STUDENT IN AN ORGANIZED HEALTH CARE EDUCATION/TRAINING PROGRAM

## 2021-08-17 PROCEDURE — 1123F ACP DISCUSS/DSCN MKR DOCD: CPT | Performed by: STUDENT IN AN ORGANIZED HEALTH CARE EDUCATION/TRAINING PROGRAM

## 2021-08-17 PROCEDURE — 3017F COLORECTAL CA SCREEN DOC REV: CPT | Performed by: STUDENT IN AN ORGANIZED HEALTH CARE EDUCATION/TRAINING PROGRAM

## 2021-08-17 PROCEDURE — 99214 OFFICE O/P EST MOD 30 MIN: CPT | Performed by: STUDENT IN AN ORGANIZED HEALTH CARE EDUCATION/TRAINING PROGRAM

## 2021-08-17 PROCEDURE — 4040F PNEUMOC VAC/ADMIN/RCVD: CPT | Performed by: STUDENT IN AN ORGANIZED HEALTH CARE EDUCATION/TRAINING PROGRAM

## 2021-08-17 RX ORDER — BENZONATATE 200 MG/1
200 CAPSULE ORAL 3 TIMES DAILY PRN
Qty: 30 CAPSULE | Refills: 0 | Status: SHIPPED | OUTPATIENT
Start: 2021-08-17 | End: 2021-08-24

## 2021-08-17 SDOH — HEALTH STABILITY: PHYSICAL HEALTH: ON AVERAGE, HOW MANY MINUTES DO YOU ENGAGE IN EXERCISE AT THIS LEVEL?: 0 MIN

## 2021-08-17 SDOH — HEALTH STABILITY: PHYSICAL HEALTH: ON AVERAGE, HOW MANY DAYS PER WEEK DO YOU ENGAGE IN MODERATE TO STRENUOUS EXERCISE (LIKE A BRISK WALK)?: 0 DAYS

## 2021-08-17 ASSESSMENT — SOCIAL DETERMINANTS OF HEALTH (SDOH)
IN A TYPICAL WEEK, HOW MANY TIMES DO YOU TALK ON THE PHONE WITH FAMILY, FRIENDS, OR NEIGHBORS?: TWICE A WEEK
HOW OFTEN DO YOU ATTEND CHURCH OR RELIGIOUS SERVICES?: NEVER
HOW OFTEN DO YOU ATTENT MEETINGS OF THE CLUB OR ORGANIZATION YOU BELONG TO?: NEVER
HOW OFTEN DO YOU GET TOGETHER WITH FRIENDS OR RELATIVES?: ONCE A WEEK
DO YOU BELONG TO ANY CLUBS OR ORGANIZATIONS SUCH AS CHURCH GROUPS UNIONS, FRATERNAL OR ATHLETIC GROUPS, OR SCHOOL GROUPS?: NO

## 2021-08-17 ASSESSMENT — LIFESTYLE VARIABLES: HOW OFTEN DO YOU HAVE A DRINK CONTAINING ALCOHOL: NEVER

## 2021-08-17 NOTE — PROGRESS NOTES
Johnson Matthew (:  1950) is a 65 y.o. male,Established patient, here for evaluation of the following chief complaint(s): Diarrhea (STARTED -GETTING WORSE ) and Fatigue         ASSESSMENT/PLAN:  1. Fatigue, unspecified type  -     Covid-19 Ambulatory  2. Viral syndrome  Concern for Covid  We will schedule testing  Increase fluids and frequent small meals throughout the day  Increase activity such as walking/stretching  Symptomatic treatment  Tylenol/Motrin for pain/fever  Tessalon Perles for cough    No follow-ups on file. SUBJECTIVE/OBJECTIVE:  HPI    Has been in bed for 2 weeks, tired, feels like he has the flu, no SHORTNESS OF BREATH, no chest pain, diarrhea, one per day, not very well, very weak and losing weight, not sure, not feeling really hungry and will eat a little, no loss of taste or smell,. Not vaccinated, he's coughing a lot, coworker diagnosed with covid. Review of Systems   All other systems reviewed and are negative. Patient-Reported Vitals 2021   Patient-Reported Weight 180LB   Patient-Reported Height 5 10        Physical Exam  Vitals reviewed. Constitutional:       General: He is not in acute distress. Appearance: Normal appearance. He is not ill-appearing. HENT:      Head: Normocephalic and atraumatic. Right Ear: External ear normal.      Left Ear: External ear normal.   Eyes:      General: No scleral icterus. Right eye: No discharge. Left eye: No discharge. Extraocular Movements: Extraocular movements intact. Conjunctiva/sclera: Conjunctivae normal.   Pulmonary:      Effort: Pulmonary effort is normal. No respiratory distress. Musculoskeletal:      Cervical back: Neck supple. Skin:     Coloration: Skin is not jaundiced. Findings: No lesion or rash. Neurological:      Mental Status: He is alert. Cranial Nerves: No cranial nerve deficit.       Coordination: Coordination normal.   Psychiatric:         Mood and Affect: Mood normal.                   Kit Rivas, was evaluated through a synchronous (real-time) audio-video encounter. The patient (or guardian if applicable) is aware that this is a billable service. Verbal consent to proceed has been obtained within the past 12 months. The visit was conducted pursuant to the emergency declaration under the [address] [place] [address], [address] [place] [address] and the [employer] and [employer] General Act. Patient identification was verified, and a caregiver was present when appropriate. The patient was located in a state where the provider was credentialed to provide care. An electronic signature was used to authenticate this note.     --Lexus Barrientos MD

## 2021-08-17 NOTE — TELEPHONE ENCOUNTER
Daughter returning call, I do not see a message as to why. Daughter can be reached at [phone].

## 2021-08-17 NOTE — TELEPHONE ENCOUNTER
Tried   Calling  No  Answer  Called the daughter they will go to [facility] station  Need orders for lab

## 2021-08-17 NOTE — TELEPHONE ENCOUNTER
are pregnant? \" \"When was your last menstrual period? \"      NA    Protocols used: WEAKNESS (GENERALIZED) AND FATIGUE-ADULT-[place]

## 2021-08-18 ENCOUNTER — TELEPHONE (OUTPATIENT)
Dept: PRIMARY CARE CLINIC | Age: 71
End: 2021-08-18

## 2021-08-18 NOTE — TELEPHONE ENCOUNTER
Notified pt daughter per dr Niki Hunter that pt will only be able to have labs done if his covid test comes back negative, pt daughter understood

## 2021-08-18 NOTE — TELEPHONE ENCOUNTER
----- Message from Mallory Jones sent at 8/18/2021  9:30 AM EDT -----  Subject: Message to Provider    QUESTIONS  Information for Provider? Fernando Hernandez, daughter and The patient are calling to   request lab orders. Per office visit yesterday with Dr. Alvaro Godinez, labs   needed drawn. Patient is fasting, and there is no existing lab order or   location for patient to go. Please order labs and call Fernando Richardson to   advise  ---------------------------------------------------------------------------  --------------  CALL BACK INFO  What is the best way for the office to contact you? OK to leave message on   voicemail  Preferred Call Back Phone Number? [phone]  ---------------------------------------------------------------------------  --------------  SCRIPT ANSWERS  Relationship to Patient?  Self

## 2021-08-18 NOTE — TELEPHONE ENCOUNTER
----- Message from Theresa Kawasaki sent at 8/18/2021  9:30 AM EDT -----  Subject: Message to Provider    QUESTIONS  Information for Provider? Shawn Bolaños, donell and The patient are calling to   request lab orders. Per office visit yesterday with Dr. Tanja Mckeon, labs   needed drawn. Patient is fasting, and there is no existing lab order or   location for patient to go. Please order labs and call Shawn Richardson Arm to   advise  ---------------------------------------------------------------------------  --------------  CALL BACK INFO  What is the best way for the office to contact you? OK to leave message on   voicemail  Preferred Call Back Phone Number? [phone]  ---------------------------------------------------------------------------  --------------  SCRIPT ANSWERS  Relationship to Patient?  Self

## 2021-08-19 ENCOUNTER — TELEPHONE (OUTPATIENT)
Dept: PRIMARY CARE CLINIC | Age: 71
End: 2021-08-19

## 2021-08-19 NOTE — TELEPHONE ENCOUNTER
Patient notified of results and gave advice for quarantine and medications. Told him to reach out to the office if he needs anything else.

## 2021-08-19 NOTE — TELEPHONE ENCOUNTER
----- Message from Maria R Stephenson sent at 9/77/1097  7:50 AM EDT -----  Subject: Results Request    QUESTIONS  Which lab or imaging result is the patient calling about? Covid-19  Which provider ordered the test? Michelle Tilley   At what location was the test performed? Date the test was performed? 2021-08-18  Additional Information for Provider? Pato Acuna requesting call back to discuss   results  ---------------------------------------------------------------------------  --------------  CALL BACK INFO  What is the best way for the office to contact you? OK to leave message on   voicemail  Preferred Call Back Phone Number?  [phone]

## 2021-10-18 DIAGNOSIS — I10 UNCONTROLLED HYPERTENSION: ICD-10-CM

## 2021-10-18 RX ORDER — CHLORTHALIDONE 25 MG/1
TABLET ORAL
Qty: 90 TABLET | Refills: 0 | Status: SHIPPED | OUTPATIENT
Start: 2021-10-18 | End: 2022-06-01

## 2021-10-18 NOTE — TELEPHONE ENCOUNTER
Medication:   Requested Prescriptions     Pending Prescriptions Disp Refills    chlorthalidone (HYGROTON) 25 MG tablet [Pharmacy Med Name: CHLORTHALIDONE 25 MG TABLET] 90 tablet 0     Sig: TAKE 1 TABLET BY MOUTH EVERY DAY     Last Filled:  7.21.21    Last appt: 8/17/2021   Next appt: Visit date not found    Last OARRS: No flowsheet data found.

## 2021-10-25 DIAGNOSIS — I10 BENIGN ESSENTIAL HTN: Primary | ICD-10-CM

## 2021-10-25 RX ORDER — LISINOPRIL 5 MG/1
TABLET ORAL
Qty: 90 TABLET | Refills: 0 | Status: SHIPPED | OUTPATIENT
Start: 2021-10-25 | End: 2022-09-29 | Stop reason: SDUPTHER

## 2021-10-25 NOTE — TELEPHONE ENCOUNTER
Medication:   Requested Prescriptions     Pending Prescriptions Disp Refills    lisinopril (PRINIVIL;ZESTRIL) 5 MG tablet [Pharmacy Med Name: LISINOPRIL 5 MG TABLET] 90 tablet 0     Sig: TAKE 1 TABLET BY MOUTH EVERY DAY     Last Filled:  07/21/21  Last appt: 8/17/2021   Next appt: Visit date not found    Last OARRS: No flowsheet data found.

## 2021-10-25 NOTE — TELEPHONE ENCOUNTER
Patient overdue for labs and office visit, BP not controlled at last visit I will place labs please have him go to any [address] [address] [facility] lab to have them drawn, then make f/u appt with me in clinic

## 2022-03-09 ENCOUNTER — OFFICE VISIT (OUTPATIENT)
Dept: PRIMARY CARE CLINIC | Age: 72
End: 2022-03-09
Payer: MEDICARE

## 2022-03-09 ENCOUNTER — TELEPHONE (OUTPATIENT)
Dept: PRIMARY CARE CLINIC | Age: 72
End: 2022-03-09

## 2022-03-09 VITALS
DIASTOLIC BLOOD PRESSURE: 84 MMHG | TEMPERATURE: 97.4 F | OXYGEN SATURATION: 98 % | HEIGHT: 70 IN | HEART RATE: 68 BPM | WEIGHT: 152 LBS | BODY MASS INDEX: 21.76 KG/M2 | SYSTOLIC BLOOD PRESSURE: 136 MMHG

## 2022-03-09 DIAGNOSIS — Z00.00 MEDICARE ANNUAL WELLNESS VISIT, SUBSEQUENT: Primary | ICD-10-CM

## 2022-03-09 DIAGNOSIS — I10 BENIGN ESSENTIAL HTN: ICD-10-CM

## 2022-03-09 DIAGNOSIS — Z12.11 SCREEN FOR COLON CANCER: Primary | ICD-10-CM

## 2022-03-09 DIAGNOSIS — N40.0 BENIGN PROSTATIC HYPERPLASIA WITHOUT LOWER URINARY TRACT SYMPTOMS: ICD-10-CM

## 2022-03-09 DIAGNOSIS — R19.03 RIGHT LOWER QUADRANT ABDOMINAL MASS: ICD-10-CM

## 2022-03-09 DIAGNOSIS — Z12.11 SCREENING FOR COLON CANCER: ICD-10-CM

## 2022-03-09 LAB
ANION GAP SERPL CALCULATED.3IONS-SCNC: 16 MMOL/L (ref 3–16)
BUN BLDV-MCNC: 22 MG/DL (ref 7–20)
CALCIUM SERPL-MCNC: 9.8 MG/DL (ref 8.3–10.6)
CHLORIDE BLD-SCNC: 104 MMOL/L (ref 99–110)
CO2: 28 MMOL/L (ref 21–32)
CREAT SERPL-MCNC: 1 MG/DL (ref 0.8–1.3)
GFR AFRICAN AMERICAN: >60
GFR NON-AFRICAN AMERICAN: >60
GLUCOSE BLD-MCNC: 115 MG/DL (ref 70–99)
POTASSIUM SERPL-SCNC: 4.7 MMOL/L (ref 3.5–5.1)
SODIUM BLD-SCNC: 148 MMOL/L (ref 136–145)

## 2022-03-09 PROCEDURE — G0439 PPPS, SUBSEQ VISIT: HCPCS | Performed by: STUDENT IN AN ORGANIZED HEALTH CARE EDUCATION/TRAINING PROGRAM

## 2022-03-09 RX ORDER — LATANOPROST 50 UG/ML
SOLUTION/ DROPS OPHTHALMIC
COMMUNITY
Start: 2022-03-01 | End: 2022-10-31 | Stop reason: SDUPTHER

## 2022-03-09 RX ORDER — TIMOLOL MALEATE 5 MG/ML
SOLUTION/ DROPS OPHTHALMIC
COMMUNITY
Start: 2022-02-23 | End: 2022-10-31 | Stop reason: SDUPTHER

## 2022-03-09 SDOH — ECONOMIC STABILITY: FOOD INSECURITY: WITHIN THE PAST 12 MONTHS, THE FOOD YOU BOUGHT JUST DIDN'T LAST AND YOU DIDN'T HAVE MONEY TO GET MORE.: NEVER TRUE

## 2022-03-09 SDOH — ECONOMIC STABILITY: FOOD INSECURITY: WITHIN THE PAST 12 MONTHS, YOU WORRIED THAT YOUR FOOD WOULD RUN OUT BEFORE YOU GOT MONEY TO BUY MORE.: NEVER TRUE

## 2022-03-09 ASSESSMENT — PATIENT HEALTH QUESTIONNAIRE - PHQ9
2. FEELING DOWN, DEPRESSED OR HOPELESS: 0
SUM OF ALL RESPONSES TO PHQ QUESTIONS 1-9: 0
SUM OF ALL RESPONSES TO PHQ QUESTIONS 1-9: 0
SUM OF ALL RESPONSES TO PHQ9 QUESTIONS 1 & 2: 0
1. LITTLE INTEREST OR PLEASURE IN DOING THINGS: 0
SUM OF ALL RESPONSES TO PHQ QUESTIONS 1-9: 0
SUM OF ALL RESPONSES TO PHQ QUESTIONS 1-9: 0

## 2022-03-09 ASSESSMENT — LIFESTYLE VARIABLES
HOW MANY STANDARD DRINKS CONTAINING ALCOHOL DO YOU HAVE ON A TYPICAL DAY: 1 OR 2
HOW OFTEN DO YOU HAVE A DRINK CONTAINING ALCOHOL: NEVER

## 2022-03-09 ASSESSMENT — SOCIAL DETERMINANTS OF HEALTH (SDOH): HOW HARD IS IT FOR YOU TO PAY FOR THE VERY BASICS LIKE FOOD, HOUSING, MEDICAL CARE, AND HEATING?: NOT HARD AT ALL

## 2022-03-09 NOTE — PATIENT INSTRUCTIONS
Personalized Preventive Plan for Aixa Prieto - 3/9/2022  Medicare offers a range of preventive health benefits. Some of the tests and screenings are paid in full while other may be subject to a deductible, co-insurance, and/or copay. Some of these benefits include a comprehensive review of your medical history including lifestyle, illnesses that may run in your family, and various assessments and screenings as appropriate. After reviewing your medical record and screening and assessments performed today your provider may have ordered immunizations, labs, imaging, and/or referrals for you. A list of these orders (if applicable) as well as your Preventive Care list are included within your After Visit Summary for your review. Other Preventive Recommendations:    · A preventive eye exam performed by an eye specialist is recommended every 1-2 years to screen for glaucoma; cataracts, macular degeneration, and other eye disorders. · A preventive dental visit is recommended every 6 months. · Try to get at least 150 minutes of exercise per week or 10,000 steps per day on a pedometer . · Order or download the FREE \"Exercise & Physical Activity: Your Everyday Guide\" from [employer]. Call 7-205.251.8107 or search [employer] online. · You need 2892-5492 mg of calcium and 5036-1990 IU of vitamin D per day. It is possible to meet your calcium requirement with diet alone, but a vitamin D supplement is usually necessary to meet this goal.  · When exposed to the sun, use a sunscreen that protects against both UVA and UVB radiation with an SPF of 30 or greater. Reapply every 2 to 3 hours or after sweating, drying off with a towel, or swimming. · Always wear a seat belt when traveling in a car. Always wear a helmet when riding a bicycle or motorcycle. Personalized Preventive Plan for Aixa Prieto - 3/9/2022  Medicare offers a range of preventive health benefits.  Some of the tests and screenings are paid in full while other may be subject to a deductible, co-insurance, and/or copay. Some of these benefits include a comprehensive review of your medical history including lifestyle, illnesses that may run in your family, and various assessments and screenings as appropriate. After reviewing your medical record and screening and assessments performed today your provider may have ordered immunizations, labs, imaging, and/or referrals for you. A list of these orders (if applicable) as well as your Preventive Care list are included within your After Visit Summary for your review. Other Preventive Recommendations:    A preventive eye exam performed by an eye specialist is recommended every 1-2 years to screen for glaucoma; cataracts, macular degeneration, and other eye disorders. A preventive dental visit is recommended every 6 months. Try to get at least 150 minutes of exercise per week or 10,000 steps per day on a pedometer . Order or download the FREE \"Exercise & Physical Activity: Your Everyday Guide\" from [employer]. Call 0-889.224.1779 or search [employer] online. You need 7874-3796 mg of calcium and 6405-8235 IU of vitamin D per day. It is possible to meet your calcium requirement with diet alone, but a vitamin D supplement is usually necessary to meet this goal.  When exposed to the sun, use a sunscreen that protects against both UVA and UVB radiation with an SPF of 30 or greater. Reapply every 2 to 3 hours or after sweating, drying off with a towel, or swimming. Always wear a seat belt when traveling in a car. Always wear a helmet when riding a bicycle or motorcycle.

## 2022-03-09 NOTE — PROGRESS NOTES
Medicare Annual Wellness Visit    Hilda Rodgers is here for Medicare [address] Arenas was seen today for medicare awv. Diagnoses and all orders for this visit:    Medicare annual wellness visit, subsequent    Benign essential HYPERTENSION  Chronic stable continue lisinopril 5 mg daily and chlorthalidone 25 mg daily  -     Basic Metabolic Panel; Future  -     POCT Urinalysis no Micro    Right lower quadrant abdominal mass  Hernia versus neoplasm  -     CT ABDOMEN PELVIS W WO CONTRAST Additional Contrast? Radiologist Recommendation; Future  BPH  Stable on Flomax 0.4 mg daily    Screening for colon cancer  Order placed     Recommendations for Preventive Services Due: see orders and patient instructions/AVS.  Recommended screening schedule for the next 5-10 years is provided to the patient in written form: see Patient Instructions/AVS.     Return for Medicare Annual Wellness Visit in 1 year. Subjective   The following acute and/or chronic problems were also addressed today:  Patient states that he has this abdominal mass ever since his appendectomy back in the 90s. He also states that this mass has been getting a lot bigger recently, nontender, no bowel complaints no pain. Hypertension stable compliant with medications    Patient's complete Health Risk Assessment and screening values have been reviewed and are found in Flowsheets. The following problems were reviewed today and where indicated follow up appointments were made and/or referrals ordered.     Positive Risk Factor Screenings with Interventions:               General Health and ACP:  General  In general, how would you say your health is?: Excellent  In the past 7 days, have you experienced any of the following: New or Increased Pain, New or Increased Fatigue, Loneliness, Social Isolation, Stress or Anger?: No  Do you get the social and emotional support that you need?: Yes  Do you have a Living Will?: Yes    Advance Directives eye: No discharge. Left eye: No discharge. Extraocular Movements: Extraocular movements intact. Conjunctiva/sclera: Conjunctivae normal.   Cardiovascular:      Rate and Rhythm: Normal rate and regular rhythm. Pulses: Normal pulses. Heart sounds: Normal heart sounds. No murmur heard. No gallop. Pulmonary:      Effort: Pulmonary effort is normal.      Breath sounds: Normal breath sounds. No wheezing, rhonchi or rales. Abdominal:      General: Abdomen is flat. Bowel sounds are normal. There is no distension. Palpations: Abdomen is soft. Comments: Moderate sized mass in RLQ firm nontender, immobile, nonreducible   Musculoskeletal:         General: Normal range of motion. Cervical back: Neck supple. No muscular tenderness. Lymphadenopathy:      Cervical: No cervical adenopathy. Skin:     General: Skin is warm. Capillary Refill: Capillary refill takes less than 2 seconds. Findings: No rash. Neurological:      General: No focal deficit present. Mental Status: He is alert. Cranial Nerves: No cranial nerve deficit. Psychiatric:         Mood and Affect: Mood normal.         Behavior: Behavior normal.             No Known Allergies  Prior to Visit Medications    Medication Sig Taking?  Authorizing Provider   Multiple Vitamins-Minerals (PRESERVISION AREDS PO) Take by mouth Yes Historical Provider, MD   lisinopril (PRINIVIL;ZESTRIL) 5 MG tablet TAKE 1 TABLET BY MOUTH EVERY DAY Yes Nicole Cantu MD   chlorthalidone (HYGROTON) 25 MG tablet TAKE 1 TABLET BY MOUTH EVERY DAY Yes Nicole Cantu MD   tamsulosin (FLOMAX) 0.4 MG capsule TAKE 1 CAPSULE BY MOUTH EVERY DAY Yes Nicole Cantu MD   Blood Pressure KIT 1 kit by Does not apply route daily Yes Nicole Cantu MD   timolol (TIMOPTIC) 0.5 % ophthalmic solution INSTILL 1 BY OPHTHALMIC ROUTE EVERY 12 HOURS INTO BOTH EYES  Historical Provider, MD   latanoprost (XALATAN) 0.005 % ophthalmic solution INSTILL 1 DROP INTO BOTH EYES EVERY DAY IN THE EVENING  Historical Provider, MD   chlorthalidone (HYGROTON) 25 MG tablet Take 1 tablet by mouth daily  Flavio Moeller MD       [facility] (Including outside providers/suppliers regularly involved in providing care):   Patient Care Team:  Flavio Moeller MD as PCP - General (Family Medicine)  Flavio Moeller MD as PCP - [facility] Empaneled Provider  Gopal Palacio MD as Consulting Physician (General Surgery)    Reviewed and updated this visit:  Allergies  Meds

## 2022-03-09 NOTE — TELEPHONE ENCOUNTER
Would highly recommend getting colonoscopy, especially with his abdominal mass. Please call pt to give him info.

## 2022-03-15 ENCOUNTER — TELEPHONE (OUTPATIENT)
Dept: PRIMARY CARE CLINIC | Age: 72
End: 2022-03-15

## 2022-03-16 ENCOUNTER — HOSPITAL ENCOUNTER (OUTPATIENT)
Dept: CT IMAGING | Age: 72
Discharge: HOME | End: 2022-03-16
Payer: MEDICARE

## 2022-03-16 DIAGNOSIS — R19.03 RIGHT LOWER QUADRANT ABDOMINAL MASS: ICD-10-CM

## 2022-03-16 PROCEDURE — 74177 CT ABD & PELVIS W/CONTRAST: CPT

## 2022-03-16 PROCEDURE — 6360000004 HC RX CONTRAST MEDICATION: Performed by: STUDENT IN AN ORGANIZED HEALTH CARE EDUCATION/TRAINING PROGRAM

## 2022-03-16 RX ADMIN — IOPAMIDOL 75 ML: 755 INJECTION, SOLUTION INTRAVENOUS at 16:28

## 2022-03-16 RX ADMIN — IOHEXOL 50 ML: 240 INJECTION, SOLUTION INTRATHECAL; INTRAVASCULAR; INTRAVENOUS; ORAL at 16:27

## 2022-03-17 ENCOUNTER — TELEPHONE (OUTPATIENT)
Dept: PRIMARY CARE CLINIC | Age: 72
End: 2022-03-17

## 2022-03-17 NOTE — TELEPHONE ENCOUNTER
Pt advised. Do you feel he needs to see surgeon for Hernia and what about his bladder, he states he does have a swollen prostate but has not seen urology for a few years  Please advise.

## 2022-03-17 NOTE — TELEPHONE ENCOUNTER
----- Message from Tab Gardner sent at 3/17/2022 12:05 PM EDT -----  Subject: Results Request    QUESTIONS  Which lab or imaging result is the patient calling about? CTSCAN  Which provider ordered the test? Raquel Lipscomb   At what location was the test performed? Date the test was performed? 2022  Additional Information for Provider? Tiffany Armstrong would like someone to contact   him with his results [phone]  ---------------------------------------------------------------------------  --------------  CALL BACK INFO  What is the best way for the office to contact you? OK to leave message on   voicemail  Preferred Call Back Phone Number?  [phone]

## 2022-03-17 NOTE — TELEPHONE ENCOUNTER
If hernia is bothering him then yes I can send him to a surgeon, and would recommend following up with urologist

## 2022-03-25 ENCOUNTER — INITIAL CONSULT (OUTPATIENT)
Dept: SURGERY | Age: 72
End: 2022-03-25
Payer: MEDICARE

## 2022-03-25 VITALS — WEIGHT: 152 LBS | SYSTOLIC BLOOD PRESSURE: 138 MMHG | BODY MASS INDEX: 21.81 KG/M2 | DIASTOLIC BLOOD PRESSURE: 82 MMHG

## 2022-03-25 DIAGNOSIS — K43.9 SPIGELIAN HERNIA: Primary | ICD-10-CM

## 2022-03-25 PROCEDURE — 99203 OFFICE O/P NEW LOW 30 MIN: CPT | Performed by: SURGERY

## 2022-03-25 ASSESSMENT — ENCOUNTER SYMPTOMS
ABDOMINAL PAIN: 1
ABDOMINAL DISTENTION: 1
RESPIRATORY NEGATIVE: 1
ALLERGIC/IMMUNOLOGIC NEGATIVE: 1
EYES NEGATIVE: 1

## 2022-03-25 NOTE — LETTER
[address] [address]  Phone: [phone]  Fax: [phone]    March 25, 2022    Patient: Ciro Muhammad  MRN:  [id number]  YOB: 1950  Date of Visit: 3/25/2022    Dear Dr Kaira Bagley: Thank you for the request for consultation for Mike Cruz. Below are the relevant portions of my assessment and plan of care. Assessment:  41-year-old male who presents for evaluation of a bulge of the abdominal wall. Physical examination is consistent with a chronically incarcerated, fat-containing spigelian hernia. This correlates with the CAT scan findings. We discussed hernia repair versus observation. CAT scan also shows questionable filling defects in the common bile duct. He has no complaints of right upper quadrant or epigastric abdominal pain. Liver function tests from 1/13/2021 were unremarkable. Plan:  The patient will take some time to decide whether or not he wishes to proceed with surgery. I sense is that he will likely continue observation. Would recommend repeating liver function tests. If they remain normal, I do not believe the biliary system requires any further work-up at this time. If you have questions, please do not hesitate to call me. I look forward to following Sidra Peabody along with you.     Sincerely,    Min Levin MD     providers:    Kiley Orellana MD  [facility] [place] [address] [address]  [place] [place]

## 2022-03-25 NOTE — PROGRESS NOTES
Ophelia Aguila is a 66 y.o. male     CC: Bulge of the abdominal wall    HPI: 58-year-old male who presents for evaluation of a several month history of a bulge of the abdominal wall. He reports that it causes occasional symptoms of mild discomfort. No history of nausea, vomiting, anorexia, unexpected weight loss, fevers, chills, change in bowel habits or urinary symptoms. Past Medical History:   Diagnosis Date    Hernia of scrotum        Patient has no known allergies. Past Surgical History:   Procedure Laterality Date    APPENDECTOMY      BACK SURGERY N/A     INGUINAL HERNIA REPAIR  8/6/2013    RIGHT    LAPAROSCOPIC APPENDECTOMY N/A 3/22/2019    LAPAROSCOPIC APPENDECTOMY performed by Vishnu Cortes MD at [address] [place] [address]          Prior to Visit Medications    Medication Sig Taking?  Authorizing Provider   timolol (TIMOPTIC) 0.5 % ophthalmic solution INSTILL 1 BY OPHTHALMIC ROUTE EVERY 12 HOURS INTO BOTH EYES Yes Historical Provider, MD   latanoprost (XALATAN) 0.005 % ophthalmic solution INSTILL 1 DROP INTO BOTH EYES EVERY DAY IN THE EVENING Yes Historical Provider, MD   Multiple Vitamins-Minerals (PRESERVISION AREDS PO) Take by mouth Yes Historical Provider, MD   lisinopril (PRINIVIL;ZESTRIL) 5 MG tablet TAKE 1 TABLET BY MOUTH EVERY DAY Yes Leah Tenorio MD   chlorthalidone (HYGROTON) 25 MG tablet TAKE 1 TABLET BY MOUTH EVERY DAY Yes Leah Tenorio MD   tamsulosin (FLOMAX) 0.4 MG capsule TAKE 1 CAPSULE BY MOUTH EVERY DAY Yes Leah Tenorio MD   Blood Pressure KIT 1 kit by Does not apply route daily Yes Leah Tenorio MD   chlorthalidone (HYGROTON) 25 MG tablet Take 1 tablet by mouth daily  Leah Tenorio MD       Social History     Socioeconomic History    Marital status:      Spouse name: Not on file    Number of children: Not on file    Years of education: Not on file    Highest education level: Not on file   Occupational History    Not on file   Tobacco Use    Smoking status: Never Smoker    Smokeless tobacco: Never Used   Vaping Use    Vaping Use: Not on file   Substance and Sexual Activity    Alcohol use: No    Drug use: No    Sexual activity: Not on file   Other Topics Concern    Not on file   Social History Narrative    Not on file     Social Determinants of Health     Financial Resource Strain: Low Risk     Difficulty of Paying Living Expenses: Not hard at all   Food Insecurity: No Food Insecurity    Worried About Running Out of Food in the Last Year: Never true    Yoana of Food in the Last Year: Never true   Transportation Needs:     Lack of Transportation (Medical): Not on file    Lack of Transportation (Non-Medical): Not on file   Physical Activity: Inactive    Days of Exercise per Week: 0 days    Minutes of Exercise per Session: 0 min   Stress: No Stress Concern Present    Feeling of Stress : Only a little   Social Connections: Socially Isolated    Frequency of Communication with Friends and Family: Twice a week    Frequency of Social Gatherings with Friends and Family: Once a week    Attends Bahai Services: Never    Active Member of Clubs or Organizations: No    Attends Club or Organization Meetings: Never    Marital Status:    Intimate Partner Violence:     Fear of Current or Ex-Partner: Not on file    Emotionally Abused: Not on file    Physically Abused: Not on file    Sexually Abused: Not on file   Housing Stability:     Unable to Pay for Housing in the Last Year: Not on file    Number of [place] in the Last Year: Not on file    Unstable Housing in the Last Year: Not on file       Review of Systems   Constitutional: Positive for appetite change and diaphoresis. HENT: Negative. Eyes: Negative. Respiratory: Negative. Cardiovascular: Negative. Gastrointestinal: Positive for abdominal distention and abdominal pain. Endocrine: Negative. Genitourinary: Negative. Musculoskeletal: Negative.     Skin: Negative. Allergic/Immunologic: Negative. Neurological: Negative.        :   Physical Exam  Constitutional:       Appearance: He is well-developed. HENT:      Head: Normocephalic and atraumatic. Right Ear: External ear normal.      Left Ear: External ear normal.   Eyes:      Conjunctiva/sclera: Conjunctivae normal.   Cardiovascular:      Rate and Rhythm: Normal rate and regular rhythm. Pulmonary:      Effort: Pulmonary effort is normal.      Breath sounds: Normal breath sounds. Abdominal:      General: There is no distension. Palpations: Abdomen is soft. Comments: Hernia located at the lateral rectus border. Physical examination, is chronically incarcerated with fat. Musculoskeletal:         General: Normal range of motion. Cervical back: Normal range of motion and neck supple. Skin:     General: Skin is warm and dry. Neurological:      Mental Status: He is alert and oriented to person, place, and time. Psychiatric:         Behavior: Behavior normal.       Wt 152 lb (68.9 kg)   BMI 21.81 kg/m²     :      64-year-old male who presents for evaluation of a bulge of the abdominal wall. Physical examination is consistent with a chronically incarcerated, fat-containing spigelian hernia. This correlates with the CAT scan findings. We discussed hernia repair versus observation. CAT scan also shows questionable filling defects in the common bile duct. He has no complaints of right upper quadrant or epigastric abdominal pain. Liver function tests from 1/13/2021 were unremarkable. Plan:      The patient will take some time to decide whether or not he wishes to proceed with surgery. I sense is that he will likely continue observation. Would recommend repeating liver function tests. If they remain normal, I do not believe the biliary system requires any further work-up at this time.

## 2022-05-15 DIAGNOSIS — N40.0 BENIGN PROSTATIC HYPERPLASIA, UNSPECIFIED WHETHER LOWER URINARY TRACT SYMPTOMS PRESENT: ICD-10-CM

## 2022-05-16 RX ORDER — TAMSULOSIN HYDROCHLORIDE 0.4 MG/1
CAPSULE ORAL
Qty: 90 CAPSULE | Refills: 2 | Status: SHIPPED | OUTPATIENT
Start: 2022-05-16

## 2022-05-16 NOTE — TELEPHONE ENCOUNTER
Medication:   Requested Prescriptions     Pending Prescriptions Disp Refills    tamsulosin (FLOMAX) 0.4 MG capsule [Pharmacy Med Name: TAMSULOSIN HCL 0.4 MG CAPSULE] 90 capsule 2     Sig: TAKE 1 CAPSULE BY MOUTH EVERY DAY     Last Filled: 5.12.21  Last appt: 3/9/2022   Next appt: 3.9.23    Last OARRS: No flowsheet data found.

## 2022-05-29 DIAGNOSIS — I10 UNCONTROLLED HYPERTENSION: ICD-10-CM

## 2022-05-31 NOTE — TELEPHONE ENCOUNTER
Medication:   Requested Prescriptions     Pending Prescriptions Disp Refills    chlorthalidone (HYGROTON) 25 MG tablet [Pharmacy Med Name: CHLORTHALIDONE 25 MG TABLET] 90 tablet 0     Sig: TAKE 1 TABLET BY MOUTH EVERY DAY       Last Filled:      Patient Phone Number: [phone] (home) [phone] (work)    Last appt: 3/9/2022   Next appt: Visit date not found    Last BMP:   Lab Results   Component Value Date     03/09/2022    K 4.7 03/09/2022     03/09/2022    CO2 28 03/09/2022    ANIONGAP 16 03/09/2022    GLUCOSE 115 03/09/2022    BUN 22 03/09/2022    CREATININE 1.0 03/09/2022    LABGLOM >60 03/09/2022    GFRAA >60 03/09/2022    CALCIUM 9.8 03/09/2022      Last CMP:   Lab Results   Component Value Date     03/09/2022    K 4.7 03/09/2022     03/09/2022    CO2 28 03/09/2022    ANIONGAP 16 03/09/2022    GLUCOSE 115 03/09/2022    BUN 22 03/09/2022    CREATININE 1.0 03/09/2022    LABGLOM >60 03/09/2022    GFRAA >60 03/09/2022    PROT 6.7 01/13/2021    LABALBU 4.5 01/13/2021    AGRATIO 2.0 01/13/2021    BILITOT 0.4 01/13/2021    ALKPHOS 59 01/13/2021    ALT 9 01/13/2021    AST 15 01/13/2021    GLOB 2.2 01/13/2021     Last Renal Function:   Lab Results   Component Value Date     03/09/2022    K 4.7 03/09/2022     03/09/2022    CO2 28 03/09/2022    GLUCOSE 115 03/09/2022    BUN 22 03/09/2022    CREATININE 1.0 03/09/2022    LABALBU 4.5 01/13/2021    CALCIUM 9.8 03/09/2022    GFRAA >60 03/09/2022       Last OARRS: No flowsheet data found.     Preferred Pharmacy:   [address] [place] [address] Jose Sahu   [address] [place] [address] [address]  Phone: [phone] Fax: [phone]

## 2022-06-01 RX ORDER — CHLORTHALIDONE 25 MG/1
TABLET ORAL
Qty: 90 TABLET | Refills: 0 | Status: SHIPPED | OUTPATIENT
Start: 2022-06-01 | End: 2022-09-29 | Stop reason: SDUPTHER

## 2022-09-29 ENCOUNTER — OFFICE VISIT (OUTPATIENT)
Dept: PRIMARY CARE CLINIC | Age: 72
End: 2022-09-29
Payer: MEDICARE

## 2022-09-29 ENCOUNTER — NURSE TRIAGE (OUTPATIENT)
Dept: OTHER | Facility: CLINIC | Age: 72
End: 2022-09-29

## 2022-09-29 VITALS
OXYGEN SATURATION: 99 % | DIASTOLIC BLOOD PRESSURE: 92 MMHG | SYSTOLIC BLOOD PRESSURE: 172 MMHG | TEMPERATURE: 98.1 F | WEIGHT: 148.4 LBS | BODY MASS INDEX: 21.29 KG/M2 | HEART RATE: 75 BPM

## 2022-09-29 DIAGNOSIS — R35.89 POLYURIA: ICD-10-CM

## 2022-09-29 DIAGNOSIS — R33.9 URINARY RETENTION: ICD-10-CM

## 2022-09-29 DIAGNOSIS — R73.9 HYPERGLYCEMIA: ICD-10-CM

## 2022-09-29 DIAGNOSIS — I10 UNCONTROLLED HYPERTENSION: Primary | ICD-10-CM

## 2022-09-29 DIAGNOSIS — I10 UNCONTROLLED HYPERTENSION: ICD-10-CM

## 2022-09-29 PROCEDURE — 99214 OFFICE O/P EST MOD 30 MIN: CPT | Performed by: STUDENT IN AN ORGANIZED HEALTH CARE EDUCATION/TRAINING PROGRAM

## 2022-09-29 PROCEDURE — 1123F ACP DISCUSS/DSCN MKR DOCD: CPT | Performed by: STUDENT IN AN ORGANIZED HEALTH CARE EDUCATION/TRAINING PROGRAM

## 2022-09-29 RX ORDER — CHLORTHALIDONE 25 MG/1
TABLET ORAL
Qty: 90 TABLET | Refills: 1 | Status: SHIPPED | OUTPATIENT
Start: 2022-09-29

## 2022-09-29 RX ORDER — LISINOPRIL 5 MG/1
TABLET ORAL
Qty: 90 TABLET | Refills: 1 | Status: SHIPPED | OUTPATIENT
Start: 2022-09-29

## 2022-09-29 ASSESSMENT — PATIENT HEALTH QUESTIONNAIRE - PHQ9: DEPRESSION UNABLE TO ASSESS: URGENT/EMERGENT SITUATION

## 2022-09-29 NOTE — PROGRESS NOTES
Madelynn Ahumada (:  1950) is a 74 y.o. male,Established patient, here for evaluation of the following chief complaint(s):  Hypertension         ASSESSMENT/PLAN:  1. Uncontrolled hypertension  -     Basic Metabolic Panel; Future  -     Hemoglobin A1C; Future  -     chlorthalidone (HYGROTON) 25 MG tablet; TAKE 1 TABLET BY MOUTH EVERY DAY, Disp-90 tablet, R-1Normal  -     lisinopril (PRINIVIL;ZESTRIL) 5 MG tablet; TAKE 1 TABLET BY MOUTH EVERY DAY, Disp-90 tablet, R-1Normal  -     Urinalysis; Future  2. Hyperglycemia  -     Hemoglobin A1C; Future  3. Polyuria  -     PSA, Prostatic Specific Antigen; Future  4. Urinary retention  -     PSA, Prostatic Specific Antigen; Future  - may need to increase flomax    Return in about 4 weeks (around 10/27/2022) for blood pressure. Subjective   SUBJECTIVE/OBJECTIVE:  HPI    No pain in abdomen saw surgeon and was told he can wait . Stopped BP meds about 6 mths ago    BP running high now    Asymptomatic per pt    Urinary frequency and nocturia no pain doesn't feel like empties bladder    Review of Systems   All other systems reviewed and are negative. Objective   Physical Exam  Vitals reviewed. Constitutional:       General: He is not in acute distress. Appearance: Normal appearance. He is not ill-appearing, toxic-appearing or diaphoretic. HENT:      Head: Normocephalic and atraumatic. Right Ear: External ear normal.      Left Ear: External ear normal.      Nose: Nose normal.      Mouth/Throat:      Mouth: Mucous membranes are moist.   Eyes:      Extraocular Movements: Extraocular movements intact. Cardiovascular:      Rate and Rhythm: Normal rate and regular rhythm. Heart sounds: Normal heart sounds. No murmur heard. No friction rub. No gallop. Pulmonary:      Effort: Pulmonary effort is normal.      Breath sounds: Normal breath sounds. No wheezing, rhonchi or rales. Musculoskeletal:      Cervical back: Normal range of motion. Skin:     General: Skin is warm. Neurological:      General: No focal deficit present. Mental Status: He is alert. Psychiatric:         Mood and Affect: Mood normal.                An electronic signature was used to authenticate this note.     --Von Garevy MD

## 2022-09-29 NOTE — TELEPHONE ENCOUNTER
Received call from wIona Nunn at [facility] with [employer]. Subjective: Caller states \"186/101, 175/93\"     Current Symptoms: elevated blood pressures, 186/101 and then 175/93    Onset: a few hours ago; sudden    Associated Symptoms:  none    Pain Severity: denies    Temperature: denies    What has been tried: nothing    LMP: NA Pregnant: NA    Recommended disposition: See in Office Today for further evaluation of elevated blood pressures. Care advice provided, patient verbalizes understanding; denies any other questions or concerns; instructed to call back for any new or worsening symptoms. Patient/Caller agrees with recommended disposition; writer provided warm transfer to [facility] at [facility] for appointment scheduling    Attention Provider: Thank you for allowing me to participate in the care of your patient. The patient was connected to triage in response to information provided to the ECC/PSC. Please do not respond through this encounter as the response is not directed to a shared pool.         Reason for Disposition   Systolic BP >= 144 OR Diastolic >= 153    Protocols used: Blood Pressure - High-ADULT-OH

## 2022-09-30 LAB
ANION GAP SERPL CALCULATED.3IONS-SCNC: 10 MMOL/L (ref 3–16)
BILIRUBIN URINE: NEGATIVE
BLOOD, URINE: NEGATIVE
BUN BLDV-MCNC: 23 MG/DL (ref 7–20)
CALCIUM SERPL-MCNC: 9 MG/DL (ref 8.3–10.6)
CHLORIDE BLD-SCNC: 105 MMOL/L (ref 99–110)
CLARITY: CLEAR
CO2: 29 MMOL/L (ref 21–32)
COLOR: YELLOW
CREAT SERPL-MCNC: 0.9 MG/DL (ref 0.8–1.3)
ESTIMATED AVERAGE GLUCOSE: 105.4 MG/DL
GFR AFRICAN AMERICAN: >60
GFR NON-AFRICAN AMERICAN: >60
GLUCOSE BLD-MCNC: 84 MG/DL (ref 70–99)
GLUCOSE URINE: NEGATIVE MG/DL
HBA1C MFR BLD: 5.3 %
KETONES, URINE: ABNORMAL MG/DL
LEUKOCYTE ESTERASE, URINE: NEGATIVE
MICROSCOPIC EXAMINATION: ABNORMAL
NITRITE, URINE: NEGATIVE
PH UA: 7.5 (ref 5–8)
POTASSIUM SERPL-SCNC: 4.1 MMOL/L (ref 3.5–5.1)
PROSTATE SPECIFIC ANTIGEN: 5.54 NG/ML (ref 0–4)
PROTEIN UA: NEGATIVE MG/DL
SODIUM BLD-SCNC: 144 MMOL/L (ref 136–145)
SPECIFIC GRAVITY UA: 1.02 (ref 1–1.03)
URINE TYPE: ABNORMAL
UROBILINOGEN, URINE: 0.2 E.U./DL

## 2022-10-07 DIAGNOSIS — R97.20 ELEVATED PSA: Primary | ICD-10-CM

## 2022-10-31 ENCOUNTER — OFFICE VISIT (OUTPATIENT)
Dept: PRIMARY CARE CLINIC | Age: 72
End: 2022-10-31
Payer: MEDICARE

## 2022-10-31 VITALS
DIASTOLIC BLOOD PRESSURE: 80 MMHG | RESPIRATION RATE: 18 BRPM | TEMPERATURE: 97.5 F | OXYGEN SATURATION: 99 % | WEIGHT: 147 LBS | BODY MASS INDEX: 21.05 KG/M2 | SYSTOLIC BLOOD PRESSURE: 122 MMHG | HEART RATE: 61 BPM | HEIGHT: 70 IN

## 2022-10-31 DIAGNOSIS — Z12.11 SCREEN FOR COLON CANCER: ICD-10-CM

## 2022-10-31 DIAGNOSIS — H25.9 AGE-RELATED CATARACT OF BOTH EYES, UNSPECIFIED AGE-RELATED CATARACT TYPE: ICD-10-CM

## 2022-10-31 DIAGNOSIS — I10 BENIGN ESSENTIAL HTN: Primary | ICD-10-CM

## 2022-10-31 DIAGNOSIS — N40.0 BENIGN PROSTATIC HYPERPLASIA, UNSPECIFIED WHETHER LOWER URINARY TRACT SYMPTOMS PRESENT: ICD-10-CM

## 2022-10-31 PROCEDURE — 3074F SYST BP LT 130 MM HG: CPT | Performed by: STUDENT IN AN ORGANIZED HEALTH CARE EDUCATION/TRAINING PROGRAM

## 2022-10-31 PROCEDURE — 3078F DIAST BP <80 MM HG: CPT | Performed by: STUDENT IN AN ORGANIZED HEALTH CARE EDUCATION/TRAINING PROGRAM

## 2022-10-31 PROCEDURE — 1123F ACP DISCUSS/DSCN MKR DOCD: CPT | Performed by: STUDENT IN AN ORGANIZED HEALTH CARE EDUCATION/TRAINING PROGRAM

## 2022-10-31 PROCEDURE — 99214 OFFICE O/P EST MOD 30 MIN: CPT | Performed by: STUDENT IN AN ORGANIZED HEALTH CARE EDUCATION/TRAINING PROGRAM

## 2022-10-31 RX ORDER — TIMOLOL MALEATE 5 MG/ML
SOLUTION/ DROPS OPHTHALMIC
Qty: 15 ML | Refills: 0 | Status: SHIPPED | OUTPATIENT
Start: 2022-10-31

## 2022-10-31 RX ORDER — LATANOPROST 50 UG/ML
SOLUTION/ DROPS OPHTHALMIC
Qty: 7.5 ML | Refills: 0 | Status: SHIPPED | OUTPATIENT
Start: 2022-10-31

## 2022-10-31 NOTE — PROGRESS NOTES
sounds. No murmur heard. No friction rub. No gallop. Pulmonary:      Effort: Pulmonary effort is normal.      Breath sounds: Normal breath sounds. No wheezing, rhonchi or rales. Musculoskeletal:      Cervical back: Normal range of motion. Skin:     General: Skin is warm. Neurological:      General: No focal deficit present. Mental Status: He is alert. Psychiatric:         Mood and Affect: Mood normal.                An electronic signature was used to authenticate this note.     --Michael Conner MD

## 2022-11-21 LAB
P2PSA: 38 PG/ML
PHI-HYB: 50
PROSTATE SPECIFIC ANTIGEN FREE: 1.97 NG/ML
PROSTATE SPECIFIC ANTIGEN PERCENT FREE: 29.3 %
PSA, TOTAL: 6.73 NG/ML (ref 0–4)

## 2022-12-13 ENCOUNTER — OFFICE VISIT (OUTPATIENT)
Dept: PRIMARY CARE CLINIC | Age: 72
End: 2022-12-13
Payer: MEDICARE

## 2022-12-13 VITALS
BODY MASS INDEX: 21.19 KG/M2 | WEIGHT: 148 LBS | RESPIRATION RATE: 18 BRPM | OXYGEN SATURATION: 99 % | HEIGHT: 70 IN | SYSTOLIC BLOOD PRESSURE: 134 MMHG | HEART RATE: 66 BPM | DIASTOLIC BLOOD PRESSURE: 86 MMHG | TEMPERATURE: 97.6 F

## 2022-12-13 DIAGNOSIS — R97.20 ELEVATED PSA: Primary | ICD-10-CM

## 2022-12-13 DIAGNOSIS — Z01.818 PREOP EXAMINATION: ICD-10-CM

## 2022-12-13 PROCEDURE — 93000 ELECTROCARDIOGRAM COMPLETE: CPT | Performed by: STUDENT IN AN ORGANIZED HEALTH CARE EDUCATION/TRAINING PROGRAM

## 2022-12-13 PROCEDURE — 1123F ACP DISCUSS/DSCN MKR DOCD: CPT | Performed by: STUDENT IN AN ORGANIZED HEALTH CARE EDUCATION/TRAINING PROGRAM

## 2022-12-13 PROCEDURE — 99214 OFFICE O/P EST MOD 30 MIN: CPT | Performed by: STUDENT IN AN ORGANIZED HEALTH CARE EDUCATION/TRAINING PROGRAM

## 2022-12-13 PROCEDURE — 3078F DIAST BP <80 MM HG: CPT | Performed by: STUDENT IN AN ORGANIZED HEALTH CARE EDUCATION/TRAINING PROGRAM

## 2022-12-13 PROCEDURE — 3074F SYST BP LT 130 MM HG: CPT | Performed by: STUDENT IN AN ORGANIZED HEALTH CARE EDUCATION/TRAINING PROGRAM

## 2022-12-13 NOTE — PROGRESS NOTES
Preoperative Consultation      Darell Roche  YOB: 1950    Date of Service:  12/13/2022    Vitals:    12/13/22 1316   BP: 134/86   Site: Right Upper Arm   Position: Sitting   Cuff Size: Medium Adult   Pulse: 66   Resp: 18   Temp: 97.6 °F (36.4 °C)   TempSrc: Infrared   SpO2: 99%   Weight: 148 lb (67.1 kg)   Height: 5' 10\" (1.778 m)      Wt Readings from Last 2 Encounters:   12/13/22 148 lb (67.1 kg)   10/31/22 147 lb (66.7 kg)     BP Readings from Last 3 Encounters:   12/13/22 134/86   10/31/22 122/80   09/29/22 (!) 172/92        Chief Complaint   Patient presents with    Pre-op Exam     [facility] MD  Trusp bx  DOS 12/30/22  FAX #[phone]     No Known Allergies  Outpatient Medications Marked as Taking for the 12/13/22 encounter (Office Visit) with Christopher Dale MD   Medication Sig Dispense Refill    latanoprost (XALATAN) 0.005 % ophthalmic solution INSTILL 1 DROP INTO BOTH EYES EVERY DAY IN THE EVENING 7.5 mL 0    timolol (TIMOPTIC) 0.5 % ophthalmic solution INSTILL 1 BY OPHTHALMIC ROUTE EVERY 12 HOURS INTO BOTH EYES 15 mL 0    chlorthalidone (HYGROTON) 25 MG tablet TAKE 1 TABLET BY MOUTH EVERY DAY 90 tablet 1    lisinopril (PRINIVIL;ZESTRIL) 5 MG tablet TAKE 1 TABLET BY MOUTH EVERY DAY 90 tablet 1    tamsulosin (FLOMAX) 0.4 MG capsule TAKE 1 CAPSULE BY MOUTH EVERY DAY (Patient taking differently: Take 0.8 mg by mouth daily) 90 capsule 2    Multiple Vitamins-Minerals (PRESERVISION AREDS PO) Take by mouth      Blood Pressure KIT 1 kit by Does not apply route daily 1 kit 0       This patient presents to the office today for a preoperative consultation at the request of surgeon,   See above    Planned anesthesia: General   Known anesthesia problems: None   Bleeding risk: No recent or remote history of abnormal bleeding  Personal or FH of DVT/PE: No    Patient objection to receiving blood products: No    Patient Active Problem List   Diagnosis    Inguinal hernia    Hernia Appendicitis, acute    Systolic murmur    Benign prostatic hyperplasia without lower urinary tract symptoms    Benign essential HTN       Past Medical History:   Diagnosis Date    Hernia of scrotum      Past Surgical History:   Procedure Laterality Date    APPENDECTOMY      BACK SURGERY N/A     INGUINAL HERNIA REPAIR  8/6/2013    RIGHT    LAPAROSCOPIC APPENDECTOMY N/A 3/22/2019    LAPAROSCOPIC APPENDECTOMY performed by Courtney García MD at [address] [place] [address]       No family history on file. Social History     Socioeconomic History    Marital status:      Spouse name: Not on file    Number of children: Not on file    Years of education: Not on file    Highest education level: Not on file   Occupational History    Not on file   Tobacco Use    Smoking status: Never    Smokeless tobacco: Never   Vaping Use    Vaping Use: Not on file   Substance and Sexual Activity    Alcohol use: No    Drug use: No    Sexual activity: Not on file   Other Topics Concern    Not on file   Social History Narrative    Not on file     Social Determinants of Health     Financial Resource Strain: Low Risk     Difficulty of Paying Living Expenses: Not hard at all   Food Insecurity: No Food Insecurity    Worried About Running Out of Food in the Last Year: Never true    Ran Out of Food in the Last Year: Never true   Transportation Needs: Not on file   Physical Activity: Inactive    Days of Exercise per Week: 0 days    Minutes of Exercise per Session: 0 min   Stress: Not on file   Social Connections: Not on file   Intimate Partner Violence: Not on file   Housing Stability: Not on file       Review of Systems  A comprehensive review of systems was negative except for what was noted in the HPI. Physical Exam   Constitutional: He is oriented to person, place, and time. He appears well-developed and well-nourished. No distress. HENT:   Head: Normocephalic and atraumatic.    Mouth/Throat: Uvula is midline, oropharynx is clear and moist and mucous membranes are normal.   Eyes: Conjunctivae and EOM are normal. Pupils are equal, round, and reactive to light. Neck: Trachea normal and normal range of motion. Neck supple. No JVD present. Carotid bruit is not present. No mass and no thyromegaly present. Cardiovascular: Normal rate, regular rhythm, normal heart sounds and intact distal pulses. Exam reveals no gallop and no friction rub. No murmur heard. Pulmonary/Chest: Effort normal and breath sounds normal. No respiratory distress. He has no wheezes. He has no rales. Abdominal: Soft. Normal aorta and bowel sounds are normal. He exhibits no distension and no mass. There is no hepatosplenomegaly. No tenderness. Musculoskeletal: He exhibits no edema and no tenderness. Neurological: He is alert and oriented to person, place, and time. He has normal strength. No cranial nerve deficit or sensory deficit. Coordination and gait normal.   Skin: Skin is warm and dry. No rash noted. No erythema. Psychiatric: He has a normal mood and affect. His behavior is normal.     EKG Interpretation:  normal EKG, normal sinus rhythm, unchanged from previous tracings. Lab Review   Orders Only on 11/18/2022   Component Date Value    PHI-Hyb 11/18/2022 50     PSA, TOTAL 11/18/2022 6.73 (A)     PSA, Free 11/18/2022 1.97     p2PSA 11/18/2022 38.0     PSA, Free Pct 11/18/2022 29.3            Assessment:       74 y.o. patient with planned surgery as above. Known risk factors for perioperative complications: Hypertension  Current medications which may produce withdrawal symptoms if withheld perioperatively: none      Plan:     1. Preoperative workup as follows: ECG, creatinine  2. Change in medication regimen before surgery: Hold all medications on morning of surgery  3.  Prophylaxis for cardiac events with perioperative beta-blockers: Not indicated  ACC/AHA indications for pre-operative beta-blocker use:    Vascular surgery with history of postitive stress test  Intermediate or high risk surgery with history of CAD   Intermediate or high risk surgery with multiple clinical predictors of CAD- 2 of the following: history of compensated or prior heart failure, history of cerebrovascular disease, DM, or renal insufficiency    Routine administration of higher-dose, long-acting metoprolol in beta-blocker-naïve patients on the day of surgery, and in the absence of dose titration is associated with an overall increase in mortality. Beta-blockers should be started days to weeks prior to surgery and titrated to pulse < 70.  4. Deep vein thrombosis prophylaxis: regimen to be chosen by surgical team  5.  No contraindications to planned surgery

## 2023-02-27 ENCOUNTER — HOSPITAL ENCOUNTER (OUTPATIENT)
Age: 73
Discharge: HOME | End: 2023-02-27
Payer: MEDICARE

## 2023-02-27 LAB
A/G RATIO: 1.7 (ref 1.1–2.2)
ALBUMIN SERPL-MCNC: 4.5 G/DL (ref 3.4–5)
ALP BLD-CCNC: 54 U/L (ref 40–129)
ALT SERPL-CCNC: 10 U/L (ref 10–40)
ANION GAP SERPL CALCULATED.3IONS-SCNC: 13 MMOL/L (ref 3–16)
AST SERPL-CCNC: 13 U/L (ref 15–37)
BASOPHILS ABSOLUTE: 0 K/UL (ref 0–0.2)
BASOPHILS RELATIVE PERCENT: 0.2 %
BILIRUB SERPL-MCNC: 0.7 MG/DL (ref 0–1)
BUN BLDV-MCNC: 16 MG/DL (ref 7–20)
CALCIUM SERPL-MCNC: 9.5 MG/DL (ref 8.3–10.6)
CHLORIDE BLD-SCNC: 102 MMOL/L (ref 99–110)
CO2: 27 MMOL/L (ref 21–32)
CREAT SERPL-MCNC: 0.9 MG/DL (ref 0.8–1.3)
EOSINOPHILS ABSOLUTE: 0 K/UL (ref 0–0.6)
EOSINOPHILS RELATIVE PERCENT: 1 %
GFR SERPL CREATININE-BSD FRML MDRD: >60 ML/MIN/{1.73_M2}
GLUCOSE BLD-MCNC: 100 MG/DL (ref 70–99)
HCT VFR BLD CALC: 35.7 % (ref 40.5–52.5)
HEMOGLOBIN: 12.2 G/DL (ref 13.5–17.5)
LYMPHOCYTES ABSOLUTE: 0.8 K/UL (ref 1–5.1)
LYMPHOCYTES RELATIVE PERCENT: 36.9 %
MCH RBC QN AUTO: 33.7 PG (ref 26–34)
MCHC RBC AUTO-ENTMCNC: 34.2 G/DL (ref 31–36)
MCV RBC AUTO: 98.5 FL (ref 80–100)
MONOCYTES ABSOLUTE: 0.1 K/UL (ref 0–1.3)
MONOCYTES RELATIVE PERCENT: 3.4 %
NEUTROPHILS ABSOLUTE: 1.3 K/UL (ref 1.7–7.7)
NEUTROPHILS RELATIVE PERCENT: 58.5 %
PDW BLD-RTO: 13.3 % (ref 12.4–15.4)
PLATELET # BLD: 96 K/UL (ref 135–450)
PLATELET SLIDE REVIEW: ABNORMAL
PMV BLD AUTO: 10 FL (ref 5–10.5)
POTASSIUM SERPL-SCNC: 3.9 MMOL/L (ref 3.5–5.1)
RBC # BLD: 3.62 M/UL (ref 4.2–5.9)
SLIDE REVIEW: ABNORMAL
SODIUM BLD-SCNC: 142 MMOL/L (ref 136–145)
TOTAL PROTEIN: 7.2 G/DL (ref 6.4–8.2)
WBC # BLD: 2.2 K/UL (ref 4–11)

## 2023-02-27 PROCEDURE — 80053 COMPREHEN METABOLIC PANEL: CPT

## 2023-02-27 PROCEDURE — 86777 TOXOPLASMA ANTIBODY: CPT

## 2023-02-27 PROCEDURE — 86780 TREPONEMA PALLIDUM: CPT

## 2023-02-27 PROCEDURE — 86778 TOXOPLASMA ANTIBODY IGM: CPT

## 2023-02-27 PROCEDURE — 85025 COMPLETE CBC W/AUTO DIFF WBC: CPT

## 2023-02-27 PROCEDURE — 86812 HLA TYPING A B OR C: CPT

## 2023-02-27 PROCEDURE — 85549 MURAMIDASE: CPT

## 2023-02-27 PROCEDURE — 86038 ANTINUCLEAR ANTIBODIES: CPT

## 2023-02-27 PROCEDURE — 82164 ANGIOTENSIN I ENZYME TEST: CPT

## 2023-02-27 PROCEDURE — 86480 TB TEST CELL IMMUN MEASURE: CPT

## 2023-02-27 PROCEDURE — 83516 IMMUNOASSAY NONANTIBODY: CPT

## 2023-02-27 PROCEDURE — 86611 BARTONELLA ANTIBODY: CPT

## 2023-02-27 PROCEDURE — 36415 COLL VENOUS BLD VENIPUNCTURE: CPT

## 2023-02-28 LAB — ANTI-NUCLEAR ANTIBODY (ANA): NEGATIVE

## 2023-03-01 LAB
CMV QNT BY NAAT, PLASMA INTERP: NOT DETECTED
CMV QNT BY NAAT, PLASMA IU/ML: NOT DETECTED
CMV QNT BY NAAT, PLASMA LOG IU/ML: NOT DETECTED
HLA B27: NEGATIVE
QUANTI TB GOLD PLUS: NEGATIVE
QUANTI TB1 MINUS NIL: 0 IU/ML (ref 0–0.34)
QUANTI TB2 MINUS NIL: 0.01 IU/ML (ref 0–0.34)
QUANTIFERON MITOGEN: >10 IU/ML
QUANTIFERON NIL: 0.01 IU/ML

## 2023-03-02 LAB
ANGIOTENSIN CONVERTING ENZYME: <10 U/L (ref 16–85)
MYELOPEROXIDASE AB: 0 AU/ML (ref 0–19)
SERINE PROTEASE 3 AB: 0 AU/ML (ref 0–19)
TOXOPLASMA GONDI AB IGM: <3 AU/ML
TOXOPLASMA GONDII AB IGG: <3 IU/ML
TREPONEMA PALLIDUM ANTIBODIES: NON REACTIVE

## 2023-03-03 LAB
BARTONELLA HENSELAE AB, IGG: NORMAL
BARTONELLA HENSELAE AB, IGM: NORMAL
LYSOZYME: <0.75 UG/ML

## 2023-03-27 ENCOUNTER — OFFICE VISIT (OUTPATIENT)
Dept: PRIMARY CARE CLINIC | Age: 73
End: 2023-03-27
Payer: MEDICARE

## 2023-03-27 VITALS
BODY MASS INDEX: 23.73 KG/M2 | HEIGHT: 67 IN | WEIGHT: 151.2 LBS | DIASTOLIC BLOOD PRESSURE: 88 MMHG | OXYGEN SATURATION: 97 % | SYSTOLIC BLOOD PRESSURE: 138 MMHG | TEMPERATURE: 97.7 F | HEART RATE: 70 BPM

## 2023-03-27 DIAGNOSIS — D64.9 ANEMIA, UNSPECIFIED TYPE: ICD-10-CM

## 2023-03-27 DIAGNOSIS — H25.9 AGE-RELATED CATARACT OF BOTH EYES, UNSPECIFIED AGE-RELATED CATARACT TYPE: ICD-10-CM

## 2023-03-27 DIAGNOSIS — I10 UNCONTROLLED HYPERTENSION: ICD-10-CM

## 2023-03-27 DIAGNOSIS — N40.0 BENIGN PROSTATIC HYPERPLASIA, UNSPECIFIED WHETHER LOWER URINARY TRACT SYMPTOMS PRESENT: ICD-10-CM

## 2023-03-27 DIAGNOSIS — D69.6 THROMBOCYTOPENIA, UNSPECIFIED (HCC): ICD-10-CM

## 2023-03-27 DIAGNOSIS — Z00.00 MEDICARE ANNUAL WELLNESS VISIT, SUBSEQUENT: Primary | ICD-10-CM

## 2023-03-27 PROCEDURE — 1123F ACP DISCUSS/DSCN MKR DOCD: CPT | Performed by: STUDENT IN AN ORGANIZED HEALTH CARE EDUCATION/TRAINING PROGRAM

## 2023-03-27 PROCEDURE — G0439 PPPS, SUBSEQ VISIT: HCPCS | Performed by: STUDENT IN AN ORGANIZED HEALTH CARE EDUCATION/TRAINING PROGRAM

## 2023-03-27 PROCEDURE — 3079F DIAST BP 80-89 MM HG: CPT | Performed by: STUDENT IN AN ORGANIZED HEALTH CARE EDUCATION/TRAINING PROGRAM

## 2023-03-27 PROCEDURE — 3075F SYST BP GE 130 - 139MM HG: CPT | Performed by: STUDENT IN AN ORGANIZED HEALTH CARE EDUCATION/TRAINING PROGRAM

## 2023-03-27 RX ORDER — LISINOPRIL 5 MG/1
TABLET ORAL
Qty: 90 TABLET | Refills: 1 | Status: SHIPPED | OUTPATIENT
Start: 2023-03-27

## 2023-03-27 RX ORDER — TAMSULOSIN HYDROCHLORIDE 0.4 MG/1
0.8 CAPSULE ORAL DAILY
Qty: 60 CAPSULE | Refills: 3
Start: 2023-03-27

## 2023-03-27 RX ORDER — CHLORTHALIDONE 25 MG/1
TABLET ORAL
Qty: 90 TABLET | Refills: 1 | Status: SHIPPED | OUTPATIENT
Start: 2023-03-27

## 2023-03-27 SDOH — ECONOMIC STABILITY: FOOD INSECURITY: WITHIN THE PAST 12 MONTHS, YOU WORRIED THAT YOUR FOOD WOULD RUN OUT BEFORE YOU GOT MONEY TO BUY MORE.: NEVER TRUE

## 2023-03-27 SDOH — ECONOMIC STABILITY: FOOD INSECURITY: WITHIN THE PAST 12 MONTHS, THE FOOD YOU BOUGHT JUST DIDN'T LAST AND YOU DIDN'T HAVE MONEY TO GET MORE.: NEVER TRUE

## 2023-03-27 SDOH — ECONOMIC STABILITY: HOUSING INSECURITY
IN THE LAST 12 MONTHS, WAS THERE A TIME WHEN YOU DID NOT HAVE A STEADY PLACE TO SLEEP OR SLEPT IN A SHELTER (INCLUDING NOW)?: NO

## 2023-03-27 SDOH — ECONOMIC STABILITY: INCOME INSECURITY: HOW HARD IS IT FOR YOU TO PAY FOR THE VERY BASICS LIKE FOOD, HOUSING, MEDICAL CARE, AND HEATING?: SOMEWHAT HARD

## 2023-03-27 ASSESSMENT — PATIENT HEALTH QUESTIONNAIRE - PHQ9
SUM OF ALL RESPONSES TO PHQ9 QUESTIONS 1 & 2: 0
SUM OF ALL RESPONSES TO PHQ QUESTIONS 1-9: 0
1. LITTLE INTEREST OR PLEASURE IN DOING THINGS: 0
SUM OF ALL RESPONSES TO PHQ QUESTIONS 1-9: 0
SUM OF ALL RESPONSES TO PHQ QUESTIONS 1-9: 0
2. FEELING DOWN, DEPRESSED OR HOPELESS: 0
SUM OF ALL RESPONSES TO PHQ QUESTIONS 1-9: 0

## 2023-03-27 NOTE — PATIENT INSTRUCTIONS
[place] on Aging online. You need 2901-4976 mg of calcium and 7744-6624 IU of vitamin D per day. It is possible to meet your calcium requirement with diet alone, but a vitamin D supplement is usually necessary to meet this goal.  When exposed to the sun, use a sunscreen that protects against both UVA and UVB radiation with an SPF of 30 or greater. Reapply every 2 to 3 hours or after sweating, drying off with a towel, or swimming. Always wear a seat belt when traveling in a car. Always wear a helmet when riding a bicycle or motorcycle.

## 2023-03-27 NOTE — PROGRESS NOTES
Jennifer    Patient's complete Health Risk Assessment and screening values have been reviewed and are found in Flowsheets. The following problems were reviewed today and where indicated follow up appointments were made and/or referrals ordered. Positive Risk Factor Screenings with Interventions:                                       Objective   Vitals:    03/27/23 1308 03/27/23 1320   BP: (!) 148/100 138/88   Site: Right Upper Arm Left Upper Arm   Position: Sitting Sitting   Cuff Size: Small Adult Small Adult   Pulse: 70    Temp: 97.7 °F (36.5 °C)    TempSrc: Temporal    SpO2: 97%    Weight: 151 lb 3.2 oz (68.6 kg)    Height: 5' 7\" (1.702 m)       Body mass index is 23.68 kg/m². Physical Exam  Vitals reviewed. Constitutional:       General: He is not in acute distress. Appearance: Normal appearance. He is not ill-appearing. HENT:      Head: Normocephalic and atraumatic. Right Ear: Tympanic membrane, ear canal and external ear normal.      Left Ear: Tympanic membrane, ear canal and external ear normal.      Nose: Nose normal. No rhinorrhea. Mouth/Throat:      Mouth: Mucous membranes are moist.      Pharynx: Oropharynx is clear. No oropharyngeal exudate. Eyes:      General: No scleral icterus. Right eye: No discharge. Left eye: No discharge. Extraocular Movements: Extraocular movements intact. Conjunctiva/sclera: Conjunctivae normal.   Cardiovascular:      Rate and Rhythm: Normal rate and regular rhythm. Pulses: Normal pulses. Heart sounds: Normal heart sounds. No murmur heard. No gallop. Pulmonary:      Effort: Pulmonary effort is normal.      Breath sounds: Normal breath sounds. No wheezing, rhonchi or rales. Abdominal:      General: Abdomen is flat. Bowel sounds are normal. There is no distension. Palpations: Abdomen is soft. There is no mass. Musculoskeletal:         General: Normal range of motion. Cervical back: Neck supple.  No

## 2023-03-28 DIAGNOSIS — D64.9 ANEMIA, UNSPECIFIED TYPE: Primary | ICD-10-CM

## 2023-03-28 LAB
BASOPHILS # BLD: 0 K/UL (ref 0–0.2)
BASOPHILS NFR BLD: 0.1 %
DEPRECATED RDW RBC AUTO: 14.2 % (ref 12.4–15.4)
EOSINOPHIL # BLD: 0 K/UL (ref 0–0.6)
EOSINOPHIL NFR BLD: 1.4 %
FERRITIN SERPL IA-MCNC: 303.7 NG/ML (ref 30–400)
HCT VFR BLD AUTO: 34.7 % (ref 40.5–52.5)
HGB BLD-MCNC: 11.8 G/DL (ref 13.5–17.5)
IRON SATN MFR SERPL: 37 % (ref 20–50)
IRON SERPL-MCNC: 117 UG/DL (ref 59–158)
LYMPHOCYTES # BLD: 0.9 K/UL (ref 1–5.1)
LYMPHOCYTES NFR BLD: 37.8 %
MCH RBC QN AUTO: 34.4 PG (ref 26–34)
MCHC RBC AUTO-ENTMCNC: 34.2 G/DL (ref 31–36)
MCV RBC AUTO: 100.6 FL (ref 80–100)
MONOCYTES # BLD: 0.1 K/UL (ref 0–1.3)
MONOCYTES NFR BLD: 3.5 %
NEUTROPHILS # BLD: 1.3 K/UL (ref 1.7–7.7)
NEUTROPHILS NFR BLD: 57.2 %
PLATELET # BLD AUTO: 88 K/UL (ref 135–450)
PLATELET BLD QL SMEAR: ABNORMAL
PMV BLD AUTO: 10.3 FL (ref 5–10.5)
RBC # BLD AUTO: 3.44 M/UL (ref 4.2–5.9)
SLIDE REVIEW: ABNORMAL
TIBC SERPL-MCNC: 320 UG/DL (ref 260–445)
WBC # BLD AUTO: 2.3 K/UL (ref 4–11)

## 2023-04-18 ENCOUNTER — HOSPITAL ENCOUNTER (OUTPATIENT)
Dept: NUCLEAR MEDICINE | Age: 73
Discharge: HOME | End: 2023-04-18
Payer: MEDICARE

## 2023-04-18 DIAGNOSIS — D53.9 MACROCYTIC ANEMIA: ICD-10-CM

## 2023-04-18 DIAGNOSIS — D61.818 PANCYTOPENIA (HCC): ICD-10-CM

## 2023-04-18 PROCEDURE — 3430000000 HC RX DIAGNOSTIC RADIOPHARMACEUTICAL: Performed by: INTERNAL MEDICINE

## 2023-04-18 PROCEDURE — A9541 TC99M SULFUR COLLOID: HCPCS | Performed by: INTERNAL MEDICINE

## 2023-04-18 PROCEDURE — 78215 LVR&SPLEEN IMG STATIC ONLY: CPT

## 2023-04-18 RX ADMIN — Medication 6.13 MILLICURIE: at 08:25

## 2023-04-26 ENCOUNTER — OFFICE VISIT (OUTPATIENT)
Dept: SURGERY | Age: 73
End: 2023-04-26
Payer: MEDICARE

## 2023-04-26 VITALS — BODY MASS INDEX: 23.81 KG/M2 | WEIGHT: 152 LBS | SYSTOLIC BLOOD PRESSURE: 136 MMHG | DIASTOLIC BLOOD PRESSURE: 84 MMHG

## 2023-04-26 DIAGNOSIS — K43.9 SPIGELIAN HERNIA: Primary | ICD-10-CM

## 2023-04-26 PROCEDURE — 1123F ACP DISCUSS/DSCN MKR DOCD: CPT | Performed by: SURGERY

## 2023-04-26 PROCEDURE — 3079F DIAST BP 80-89 MM HG: CPT | Performed by: SURGERY

## 2023-04-26 PROCEDURE — 99214 OFFICE O/P EST MOD 30 MIN: CPT | Performed by: SURGERY

## 2023-04-26 PROCEDURE — 3075F SYST BP GE 130 - 139MM HG: CPT | Performed by: SURGERY

## 2023-04-26 NOTE — PROGRESS NOTES
Subjective:      Chief complaint-bulge of the abdominal wall    Patient ID: HPI-Allen [place] is a 74 y.o. male seen in follow-up for a bulge of the abdominal wall    HPI    Review of Systems    Objective:   Physical Exam  Constitutional:       Appearance: He is well-developed. HENT:      Head: Normocephalic and atraumatic. Right Ear: External ear normal.      Left Ear: External ear normal.   Eyes:      Conjunctiva/sclera: Conjunctivae normal.   Cardiovascular:      Rate and Rhythm: Normal rate and regular rhythm. Pulmonary:      Effort: Pulmonary effort is normal. No respiratory distress. Abdominal:      General: There is no distension. Palpations: Abdomen is soft. Comments: Right lower quadrant hernia chronically incarcerated with fat   Musculoskeletal:         General: Normal range of motion. Cervical back: Normal range of motion and neck supple. Skin:     General: Skin is warm and dry. Neurological:      Mental Status: He is alert and oriented to person, place, and time. Psychiatric:         Behavior: Behavior normal.       Assessment:      Pleasant 77-year-old male who was seen in March of last year for a fat-containing spigelian hernia. The hernia has increased in size over time and is now causing localized symptoms of discomfort. Physical examination reveals a chronically incarcerated hernia at the lateral edge of the right rectus border. Plan:      Robotic laparoscopic repair of incarcerated spigelian hernia with mesh. Patient explained risks, benefits and possible complications including risk of bleeding, bowel injury, hernia recurrence, infection requiring mesh removal, erosion of mesh into bowel or nerve entrapment.           Nate Muir MD

## 2023-04-27 ENCOUNTER — TELEPHONE (OUTPATIENT)
Dept: SURGERY | Age: 73
End: 2023-04-27

## 2023-04-27 NOTE — TELEPHONE ENCOUNTER
CALLING TO SEE IF YOU HAVE SPOKEN WITH DR GUTIERREZ'S OFFICE AND IF SO, TO SCHEDULE SURGERY.   CARY-MAGALY [phone]

## 2023-05-04 RX ORDER — ASCORBIC ACID 500 MG
500 TABLET ORAL 3 TIMES DAILY
COMMUNITY

## 2023-05-05 ENCOUNTER — OFFICE VISIT (OUTPATIENT)
Dept: PRIMARY CARE CLINIC | Age: 73
End: 2023-05-05

## 2023-05-05 VITALS
WEIGHT: 157.8 LBS | BODY MASS INDEX: 23.37 KG/M2 | DIASTOLIC BLOOD PRESSURE: 88 MMHG | HEIGHT: 69 IN | SYSTOLIC BLOOD PRESSURE: 136 MMHG | HEART RATE: 74 BPM | TEMPERATURE: 97.4 F | OXYGEN SATURATION: 97 %

## 2023-05-05 DIAGNOSIS — I10 BENIGN ESSENTIAL HTN: ICD-10-CM

## 2023-05-05 DIAGNOSIS — K46.0 INCARCERATED HERNIA: Primary | ICD-10-CM

## 2023-05-05 DIAGNOSIS — Z01.818 PRE-OP EVALUATION: ICD-10-CM

## 2023-05-05 RX ORDER — VIT A/VIT C/VIT E/ZINC/COPPER 4296-226
1 CAPSULE ORAL DAILY
Qty: 90 CAPSULE | Refills: 1 | Status: SHIPPED | OUTPATIENT
Start: 2023-05-05

## 2023-05-05 NOTE — PROGRESS NOTES
Preoperative Consultation      Homero Median  YOB: 1950    Date of Service:  5/5/2023    Vitals:    05/05/23 1100   BP: 136/88   Pulse: 74   Temp: 97.4 °F (36.3 °C)   TempSrc: Temporal   SpO2: 97%   Weight: 157 lb 12.8 oz (71.6 kg)   Height: 5' 8.5\" (1.74 m)      Wt Readings from Last 2 Encounters:   05/05/23 157 lb 12.8 oz (71.6 kg)   04/26/23 152 lb (68.9 kg)     BP Readings from Last 3 Encounters:   05/05/23 136/88   04/26/23 136/84   03/27/23 138/88        Chief Complaint   Patient presents with    Follow-up     No Known Allergies  Outpatient Medications Marked as Taking for the 5/5/23 encounter (Office Visit) with Darrian Hall MD   Medication Sig Dispense Refill    Multiple Vitamins-Minerals (PRESERVISION AREDS) CAPS Take 1 caplet by mouth daily 90 capsule 1    vitamin C (ASCORBIC ACID) 500 MG tablet Take 1 tablet by mouth 3 times daily      tamsulosin (FLOMAX) 0.4 MG capsule Take 2 capsules by mouth daily (Patient taking differently: Take 2 capsules by mouth 2 times daily PER PT'S DAUGHTER, TAKES 2 CAPSULES TWICE A DAY. ) 60 capsule 3    chlorthalidone (HYGROTON) 25 MG tablet TAKE 1 TABLET BY MOUTH EVERY DAY (Patient taking differently: nightly TAKE 1 TABLET BY MOUTH EVERY DAY) 90 tablet 1    lisinopril (PRINIVIL;ZESTRIL) 5 MG tablet TAKE 1 TABLET BY MOUTH EVERY DAY (Patient taking differently: nightly TAKE 1 TABLET BY MOUTH EVERY DAY) 90 tablet 1    latanoprost (XALATAN) 0.005 % ophthalmic solution INSTILL 1 DROP INTO BOTH EYES EVERY DAY IN THE EVENING 7.5 mL 0    timolol (TIMOPTIC) 0.5 % ophthalmic solution INSTILL 1 BY OPHTHALMIC ROUTE EVERY 12 HOURS INTO BOTH EYES 15 mL 0    Blood Pressure KIT 1 kit by Does not apply route daily 1 kit 0       This patient presents to the office today for a preoperative consultation at the request of surgeon, Roz Almeida MD, for an abdominal hernia    He has had this hernia for many years but recently has increased in size and now causing discomfort

## 2023-05-08 ENCOUNTER — ANESTHESIA EVENT (OUTPATIENT)
Dept: OPERATING ROOM | Age: 73
End: 2023-05-08
Payer: MEDICARE

## 2023-05-09 ENCOUNTER — HOSPITAL ENCOUNTER (OUTPATIENT)
Age: 73
Setting detail: OUTPATIENT SURGERY
Discharge: HOME | End: 2023-05-09
Attending: SURGERY | Admitting: SURGERY
Payer: MEDICARE

## 2023-05-09 ENCOUNTER — ANESTHESIA (OUTPATIENT)
Dept: OPERATING ROOM | Age: 73
End: 2023-05-09
Payer: MEDICARE

## 2023-05-09 VITALS
WEIGHT: 148 LBS | HEART RATE: 74 BPM | SYSTOLIC BLOOD PRESSURE: 155 MMHG | HEIGHT: 67 IN | OXYGEN SATURATION: 96 % | BODY MASS INDEX: 23.23 KG/M2 | TEMPERATURE: 96.9 F | DIASTOLIC BLOOD PRESSURE: 98 MMHG | RESPIRATION RATE: 19 BRPM

## 2023-05-09 DIAGNOSIS — K43.9 VENTRAL HERNIA WITHOUT OBSTRUCTION OR GANGRENE: Primary | ICD-10-CM

## 2023-05-09 PROCEDURE — 2500000003 HC RX 250 WO HCPCS: Performed by: NURSE ANESTHETIST, CERTIFIED REGISTERED

## 2023-05-09 PROCEDURE — 6360000002 HC RX W HCPCS: Performed by: NURSE ANESTHETIST, CERTIFIED REGISTERED

## 2023-05-09 PROCEDURE — 2580000003 HC RX 258: Performed by: SURGERY

## 2023-05-09 PROCEDURE — A4217 STERILE WATER/SALINE, 500 ML: HCPCS | Performed by: SURGERY

## 2023-05-09 PROCEDURE — 2580000003 HC RX 258: Performed by: NURSE ANESTHETIST, CERTIFIED REGISTERED

## 2023-05-09 PROCEDURE — 3600000009 HC SURGERY ROBOT BASE: Performed by: SURGERY

## 2023-05-09 PROCEDURE — 6360000002 HC RX W HCPCS: Performed by: SURGERY

## 2023-05-09 PROCEDURE — 2500000003 HC RX 250 WO HCPCS: Performed by: SURGERY

## 2023-05-09 PROCEDURE — 7100000000 HC PACU RECOVERY - FIRST 15 MIN: Performed by: SURGERY

## 2023-05-09 PROCEDURE — 3600000019 HC SURGERY ROBOT ADDTL 15MIN: Performed by: SURGERY

## 2023-05-09 PROCEDURE — 3700000000 HC ANESTHESIA ATTENDED CARE: Performed by: SURGERY

## 2023-05-09 PROCEDURE — 2709999900 HC NON-CHARGEABLE SUPPLY: Performed by: SURGERY

## 2023-05-09 PROCEDURE — 7100000001 HC PACU RECOVERY - ADDTL 15 MIN: Performed by: SURGERY

## 2023-05-09 PROCEDURE — 7100000011 HC PHASE II RECOVERY - ADDTL 15 MIN: Performed by: SURGERY

## 2023-05-09 PROCEDURE — 6360000002 HC RX W HCPCS: Performed by: ANESTHESIOLOGY

## 2023-05-09 PROCEDURE — C1781 MESH (IMPLANTABLE): HCPCS | Performed by: SURGERY

## 2023-05-09 PROCEDURE — S2900 ROBOTIC SURGICAL SYSTEM: HCPCS | Performed by: SURGERY

## 2023-05-09 PROCEDURE — 3700000001 HC ADD 15 MINUTES (ANESTHESIA): Performed by: SURGERY

## 2023-05-09 PROCEDURE — 7100000010 HC PHASE II RECOVERY - FIRST 15 MIN: Performed by: SURGERY

## 2023-05-09 PROCEDURE — 6370000000 HC RX 637 (ALT 250 FOR IP): Performed by: ANESTHESIOLOGY

## 2023-05-09 DEVICE — MESH SURG DIA4.5IN CIR SEPRA TECHNOLOGY VENTRALIGHT: Type: IMPLANTABLE DEVICE | Site: ABDOMEN | Status: FUNCTIONAL

## 2023-05-09 RX ORDER — HYDROMORPHONE HCL 110MG/55ML
0.5 PATIENT CONTROLLED ANALGESIA SYRINGE INTRAVENOUS EVERY 5 MIN PRN
Status: DISCONTINUED | OUTPATIENT
Start: 2023-05-09 | End: 2023-05-09 | Stop reason: HOSPADM

## 2023-05-09 RX ORDER — SODIUM CHLORIDE 0.9 % (FLUSH) 0.9 %
5-40 SYRINGE (ML) INJECTION EVERY 12 HOURS SCHEDULED
Status: DISCONTINUED | OUTPATIENT
Start: 2023-05-09 | End: 2023-05-09 | Stop reason: HOSPADM

## 2023-05-09 RX ORDER — HYDROCODONE BITARTRATE AND ACETAMINOPHEN 5; 325 MG/1; MG/1
1-2 TABLET ORAL EVERY 4 HOURS PRN
Qty: 20 TABLET | Refills: 0 | Status: SHIPPED | OUTPATIENT
Start: 2023-05-09 | End: 2023-05-12

## 2023-05-09 RX ORDER — SODIUM CHLORIDE 9 MG/ML
INJECTION, SOLUTION INTRAVENOUS PRN
Status: DISCONTINUED | OUTPATIENT
Start: 2023-05-09 | End: 2023-05-09 | Stop reason: HOSPADM

## 2023-05-09 RX ORDER — GLYCOPYRROLATE 0.2 MG/ML
INJECTION INTRAMUSCULAR; INTRAVENOUS PRN
Status: DISCONTINUED | OUTPATIENT
Start: 2023-05-09 | End: 2023-05-09 | Stop reason: SDUPTHER

## 2023-05-09 RX ORDER — HYDROCODONE BITARTRATE AND ACETAMINOPHEN 5; 325 MG/1; MG/1
1 TABLET ORAL ONCE
Status: COMPLETED | OUTPATIENT
Start: 2023-05-09 | End: 2023-05-09

## 2023-05-09 RX ORDER — PROPOFOL 10 MG/ML
INJECTION, EMULSION INTRAVENOUS PRN
Status: DISCONTINUED | OUTPATIENT
Start: 2023-05-09 | End: 2023-05-09 | Stop reason: SDUPTHER

## 2023-05-09 RX ORDER — DEXAMETHASONE SODIUM PHOSPHATE 4 MG/ML
INJECTION, SOLUTION INTRA-ARTICULAR; INTRALESIONAL; INTRAMUSCULAR; INTRAVENOUS; SOFT TISSUE PRN
Status: DISCONTINUED | OUTPATIENT
Start: 2023-05-09 | End: 2023-05-09 | Stop reason: SDUPTHER

## 2023-05-09 RX ORDER — FENTANYL CITRATE 50 UG/ML
INJECTION, SOLUTION INTRAMUSCULAR; INTRAVENOUS PRN
Status: DISCONTINUED | OUTPATIENT
Start: 2023-05-09 | End: 2023-05-09 | Stop reason: SDUPTHER

## 2023-05-09 RX ORDER — MAGNESIUM HYDROXIDE 1200 MG/15ML
LIQUID ORAL CONTINUOUS PRN
Status: COMPLETED | OUTPATIENT
Start: 2023-05-09 | End: 2023-05-09

## 2023-05-09 RX ORDER — SODIUM CHLORIDE 9 MG/ML
INJECTION, SOLUTION INTRAVENOUS CONTINUOUS PRN
Status: DISCONTINUED | OUTPATIENT
Start: 2023-05-09 | End: 2023-05-09 | Stop reason: SDUPTHER

## 2023-05-09 RX ORDER — ROCURONIUM BROMIDE 10 MG/ML
INJECTION, SOLUTION INTRAVENOUS PRN
Status: DISCONTINUED | OUTPATIENT
Start: 2023-05-09 | End: 2023-05-09 | Stop reason: SDUPTHER

## 2023-05-09 RX ORDER — BUPIVACAINE HYDROCHLORIDE AND EPINEPHRINE 5; 5 MG/ML; UG/ML
INJECTION, SOLUTION EPIDURAL; INTRACAUDAL; PERINEURAL
Status: COMPLETED | OUTPATIENT
Start: 2023-05-09 | End: 2023-05-09

## 2023-05-09 RX ORDER — DEXMEDETOMIDINE HYDROCHLORIDE 100 UG/ML
INJECTION, SOLUTION INTRAVENOUS PRN
Status: DISCONTINUED | OUTPATIENT
Start: 2023-05-09 | End: 2023-05-09 | Stop reason: SDUPTHER

## 2023-05-09 RX ORDER — SODIUM CHLORIDE 0.9 % (FLUSH) 0.9 %
5-40 SYRINGE (ML) INJECTION PRN
Status: DISCONTINUED | OUTPATIENT
Start: 2023-05-09 | End: 2023-05-09 | Stop reason: HOSPADM

## 2023-05-09 RX ORDER — KETOROLAC TROMETHAMINE 30 MG/ML
INJECTION, SOLUTION INTRAMUSCULAR; INTRAVENOUS PRN
Status: DISCONTINUED | OUTPATIENT
Start: 2023-05-09 | End: 2023-05-09 | Stop reason: SDUPTHER

## 2023-05-09 RX ORDER — ONDANSETRON 2 MG/ML
4 INJECTION INTRAMUSCULAR; INTRAVENOUS
Status: COMPLETED | OUTPATIENT
Start: 2023-05-09 | End: 2023-05-09

## 2023-05-09 RX ORDER — DIPHENHYDRAMINE HYDROCHLORIDE 50 MG/ML
12.5 INJECTION INTRAMUSCULAR; INTRAVENOUS
Status: DISCONTINUED | OUTPATIENT
Start: 2023-05-09 | End: 2023-05-09 | Stop reason: HOSPADM

## 2023-05-09 RX ORDER — ONDANSETRON 2 MG/ML
INJECTION INTRAMUSCULAR; INTRAVENOUS PRN
Status: DISCONTINUED | OUTPATIENT
Start: 2023-05-09 | End: 2023-05-09 | Stop reason: SDUPTHER

## 2023-05-09 RX ORDER — KETAMINE HYDROCHLORIDE 10 MG/ML
INJECTION INTRAMUSCULAR; INTRAVENOUS PRN
Status: DISCONTINUED | OUTPATIENT
Start: 2023-05-09 | End: 2023-05-09 | Stop reason: SDUPTHER

## 2023-05-09 RX ORDER — HYDROMORPHONE HCL 110MG/55ML
0.25 PATIENT CONTROLLED ANALGESIA SYRINGE INTRAVENOUS EVERY 5 MIN PRN
Status: DISCONTINUED | OUTPATIENT
Start: 2023-05-09 | End: 2023-05-09 | Stop reason: HOSPADM

## 2023-05-09 RX ORDER — MEPERIDINE HYDROCHLORIDE 25 MG/ML
12.5 INJECTION INTRAMUSCULAR; INTRAVENOUS; SUBCUTANEOUS EVERY 5 MIN PRN
Status: DISCONTINUED | OUTPATIENT
Start: 2023-05-09 | End: 2023-05-09 | Stop reason: HOSPADM

## 2023-05-09 RX ORDER — MAGNESIUM SULFATE HEPTAHYDRATE 500 MG/ML
INJECTION, SOLUTION INTRAMUSCULAR; INTRAVENOUS PRN
Status: DISCONTINUED | OUTPATIENT
Start: 2023-05-09 | End: 2023-05-09 | Stop reason: SDUPTHER

## 2023-05-09 RX ORDER — LIDOCAINE HYDROCHLORIDE 20 MG/ML
INJECTION, SOLUTION INFILTRATION; PERINEURAL PRN
Status: DISCONTINUED | OUTPATIENT
Start: 2023-05-09 | End: 2023-05-09 | Stop reason: SDUPTHER

## 2023-05-09 RX ADMIN — DEXMEDETOMIDINE HYDROCHLORIDE 10 MCG: 100 INJECTION, SOLUTION INTRAVENOUS at 16:20

## 2023-05-09 RX ADMIN — PHENYLEPHRINE HYDROCHLORIDE 100 MCG: 10 INJECTION INTRAVENOUS at 16:07

## 2023-05-09 RX ADMIN — PHENYLEPHRINE HYDROCHLORIDE 100 MCG: 10 INJECTION INTRAVENOUS at 16:51

## 2023-05-09 RX ADMIN — MAGNESIUM SULFATE HEPTAHYDRATE 1 G: 500 INJECTION, SOLUTION INTRAMUSCULAR; INTRAVENOUS at 15:56

## 2023-05-09 RX ADMIN — HYDROMORPHONE HYDROCHLORIDE 0.5 MG: 2 INJECTION, SOLUTION INTRAMUSCULAR; INTRAVENOUS; SUBCUTANEOUS at 17:51

## 2023-05-09 RX ADMIN — GLYCOPYRROLATE 0.2 MG: 0.2 INJECTION, SOLUTION INTRAMUSCULAR; INTRAVENOUS at 15:58

## 2023-05-09 RX ADMIN — KETAMINE HYDROCHLORIDE 25 MG: 10 INJECTION, SOLUTION INTRAMUSCULAR; INTRAVENOUS at 16:15

## 2023-05-09 RX ADMIN — FENTANYL CITRATE 50 MCG: 50 INJECTION, SOLUTION INTRAMUSCULAR; INTRAVENOUS at 16:08

## 2023-05-09 RX ADMIN — HYDROCODONE BITARTRATE AND ACETAMINOPHEN 1 TABLET: 5; 325 TABLET ORAL at 18:18

## 2023-05-09 RX ADMIN — FENTANYL CITRATE 50 MCG: 50 INJECTION, SOLUTION INTRAMUSCULAR; INTRAVENOUS at 15:59

## 2023-05-09 RX ADMIN — PHENYLEPHRINE HYDROCHLORIDE 100 MCG: 10 INJECTION INTRAVENOUS at 16:40

## 2023-05-09 RX ADMIN — KETAMINE HYDROCHLORIDE 25 MG: 10 INJECTION, SOLUTION INTRAMUSCULAR; INTRAVENOUS at 16:05

## 2023-05-09 RX ADMIN — SODIUM CHLORIDE: 9 INJECTION, SOLUTION INTRAVENOUS at 16:44

## 2023-05-09 RX ADMIN — ONDANSETRON 4 MG: 2 INJECTION INTRAMUSCULAR; INTRAVENOUS at 16:12

## 2023-05-09 RX ADMIN — PROPOFOL 50 MG: 10 INJECTION, EMULSION INTRAVENOUS at 16:11

## 2023-05-09 RX ADMIN — LIDOCAINE HYDROCHLORIDE 100 MG: 20 INJECTION, SOLUTION INFILTRATION; PERINEURAL at 15:59

## 2023-05-09 RX ADMIN — PROPOFOL 200 MG: 10 INJECTION, EMULSION INTRAVENOUS at 15:59

## 2023-05-09 RX ADMIN — CEFAZOLIN 2000 MG: 2 INJECTION, POWDER, FOR SOLUTION INTRAMUSCULAR; INTRAVENOUS at 15:50

## 2023-05-09 RX ADMIN — SUGAMMADEX 200 MG: 100 INJECTION, SOLUTION INTRAVENOUS at 16:58

## 2023-05-09 RX ADMIN — KETOROLAC TROMETHAMINE 15 MG: 60 INJECTION, SOLUTION INTRAMUSCULAR at 16:58

## 2023-05-09 RX ADMIN — SODIUM CHLORIDE: 9 INJECTION, SOLUTION INTRAVENOUS at 15:00

## 2023-05-09 RX ADMIN — DEXAMETHASONE SODIUM PHOSPHATE 4 MG: 4 INJECTION, SOLUTION INTRAMUSCULAR; INTRAVENOUS at 16:11

## 2023-05-09 RX ADMIN — ONDANSETRON 4 MG: 2 INJECTION INTRAMUSCULAR; INTRAVENOUS at 17:54

## 2023-05-09 RX ADMIN — ROCURONIUM BROMIDE 50 MG: 10 INJECTION, SOLUTION INTRAVENOUS at 15:59

## 2023-05-09 RX ADMIN — PHENYLEPHRINE HYDROCHLORIDE 100 MCG: 10 INJECTION INTRAVENOUS at 16:03

## 2023-05-09 RX ADMIN — PROPOFOL 50 MG: 10 INJECTION, EMULSION INTRAVENOUS at 16:17

## 2023-05-09 ASSESSMENT — PAIN DESCRIPTION - LOCATION
LOCATION: ABDOMEN;INCISION
LOCATION: ABDOMEN

## 2023-05-09 ASSESSMENT — PAIN SCALES - GENERAL
PAINLEVEL_OUTOF10: 7
PAINLEVEL_OUTOF10: 7

## 2023-05-09 ASSESSMENT — PAIN DESCRIPTION - DESCRIPTORS
DESCRIPTORS: ACHING
DESCRIPTORS: ACHING

## 2023-05-09 ASSESSMENT — PAIN DESCRIPTION - ORIENTATION: ORIENTATION: LEFT

## 2023-05-09 ASSESSMENT — PAIN - FUNCTIONAL ASSESSMENT: PAIN_FUNCTIONAL_ASSESSMENT: 0-10

## 2023-05-09 NOTE — H&P
Frederick Dodson     HPI: 58-year-old male here for ventral hernia repair    Past Medical History:   Diagnosis Date    Glaucoma     Hernia of scrotum     Hypertension     Macrocytic anemia     Pancytopenia ([address]        Past Surgical History:   Procedure Laterality Date    APPENDECTOMY      BACK SURGERY N/A     INGUINAL HERNIA REPAIR  8/6/2013    RIGHT    LAPAROSCOPIC APPENDECTOMY N/A 3/22/2019    LAPAROSCOPIC APPENDECTOMY performed by Gilma Guzman MD at [facility] History     Socioeconomic History    Marital status:      Spouse name: Not on file    Number of children: Not on file    Years of education: Not on file    Highest education level: Not on file   Occupational History    Not on file   Tobacco Use    Smoking status: Never    Smokeless tobacco: Never   Vaping Use    Vaping Use: Never used   Substance and Sexual Activity    Alcohol use: No    Drug use: No    Sexual activity: Not on file   Other Topics Concern    Not on file   Social History Narrative    Not on file     Social Determinants of Health     Financial Resource Strain: Medium Risk    Difficulty of Paying Living Expenses: Somewhat hard   Food Insecurity: No Food Insecurity    Worried About Running Out of Food in the Last Year: Never true    Ran Out of Food in the Last Year: Never true   Transportation Needs: Unknown    Lack of Transportation (Medical): Not on file    Lack of Transportation (Non-Medical): No   Physical Activity: Not on file   Stress: Not on file   Social Connections: Not on file   Intimate Partner Violence: Not on file   Housing Stability: Unknown    Unable to Pay for Housing in the Last Year: Not on file    Number of Places Lived in the Last Year: Not on file    Unstable Housing in the Last Year: No       Allergies: No Known Allergies    Prior to Admission medications    Medication Sig Start Date End Date Taking?  Authorizing Provider   vitamin C (ASCORBIC ACID) 500 MG tablet Take 1 tablet by

## 2023-05-09 NOTE — PROGRESS NOTES
Pt admitted to PACU, not yet fully awake, abd lap sites x3 CD&I, oral airway in place, RR easy and regular on simple mask 5L, IVF infusing, NSR on tele, vss

## 2023-05-09 NOTE — ANESTHESIA POSTPROCEDURE EVALUATION
Department of Anesthesiology  Postprocedure Note    Patient: Cookie Zapata  MRN: [id number]  YOB: 1950  Date of evaluation: 5/9/2023      Procedure Summary     Date: 05/09/23 Room / Location: [address]    Anesthesia Start: 1555 Anesthesia Stop: 1714    Procedure: [address] (Abdomen) Diagnosis:       Ventral hernia without obstruction or gangrene      (Ventral hernia without obstruction or gangrene [K43.9])    Surgeons: Tashi Jones MD Responsible Provider: Monster Russo MD    Anesthesia Type: general ASA Status: 2          Anesthesia Type: No value filed.     Ashleigh Phase I: Ashleigh Score: 8    Ashleigh Phase II:        Anesthesia Post Evaluation    Patient location during evaluation: PACU  Patient participation: complete - patient participated  Level of consciousness: awake and alert  Airway patency: patent  Nausea & Vomiting: no nausea and no vomiting  Complications: no  Cardiovascular status: hemodynamically stable  Respiratory status: acceptable  Hydration status: stable  Multimodal analgesia pain management approach

## 2023-05-09 NOTE — DISCHARGE INSTRUCTIONS
Contact information       Office number - [phone]   Office hours are 8 am - 5 pm  Monday - Friday   Contact the doctor on call during the evenings ( 5 pm - 8 am ) or on Weekends for urgent or emergent issues by using the main office number ( [phone] ). Please hold routine questions until normal business hours. Wound care     The incisions are closed with dissolvable sutures which are beneath the skin. Surgical glue is then applied to the skin. The glue is purple in color and should be left undisturbed until your follow-up appointment. No bandages are required over the surgical glue. It is okay to shower but do not bathe in a bathtub. Gently wash over the incisions with soap and water and then pat them dry with a towel. Contact the office for redness of the skin surrounding the incision or if there is drainage of pus. Activities     It is generally okay to go up and down stairs. Please be careful as your gate may be unsteady from the surgery or pain medications. Driving: Do not drive while on narcotic pain medications or while still under the effect of narcotic pain medications. Make sure that you are moving comfortably and not limited by postoperative pain or weakness that would make it difficult to react in an emergency situation. Exercise : The main purpose of the activity restrictions is to reduce the risk of developing a hernia at an incision site. Do not lift greater than 25 lbs                    Avoid strenuous abdominal exercises- sit ups, core workouts                    Light cardiovascular exercise is generally okay                    Ask your doctor about the length of the exercise restrictions     Follow up     Please call the office for any concerns between the time of surgery and your follow up appointment.   For your convenience, we ask that you schedule your follow up appointment about 2 weeks from the time of surgery at a time which works

## 2023-05-09 NOTE — PROGRESS NOTES
Pt awake and oriented, vss, weaned to RA, pt states pain 5/10. No further nausea, abd lap sites intact, phase 1 discharge criteria met

## 2023-05-09 NOTE — ANESTHESIA PRE PROCEDURE
mouth 3 times daily      tamsulosin (FLOMAX) 0.4 MG capsule Take 2 capsules by mouth daily (Patient taking differently: Take 2 capsules by mouth 2 times daily PER PT'S DAUGHTER, TAKES 2 CAPSULES TWICE A DAY. ) 60 capsule 3    chlorthalidone (HYGROTON) 25 MG tablet TAKE 1 TABLET BY MOUTH EVERY DAY (Patient taking differently: nightly TAKE 1 TABLET BY MOUTH EVERY DAY) 90 tablet 1    lisinopril (PRINIVIL;ZESTRIL) 5 MG tablet TAKE 1 TABLET BY MOUTH EVERY DAY (Patient taking differently: nightly TAKE 1 TABLET BY MOUTH EVERY DAY) 90 tablet 1    latanoprost (XALATAN) 0.005 % ophthalmic solution INSTILL 1 DROP INTO BOTH EYES EVERY DAY IN THE EVENING 7.5 mL 0    timolol (TIMOPTIC) 0.5 % ophthalmic solution INSTILL 1 BY OPHTHALMIC ROUTE EVERY 12 HOURS INTO BOTH EYES 15 mL 0    Blood Pressure KIT 1 kit by Does not apply route daily 1 kit 0     No current facility-administered medications for this visit. Allergies:  No Known Allergies    Problem List:    Patient Active Problem List   Diagnosis Code    Inguinal hernia K40.90    Hernia K46.9    Appendicitis, acute F44.81    Systolic murmur Z85.8    Benign prostatic hyperplasia without lower urinary tract symptoms N40.0    Benign essential HTN I10    Thrombocytopenia, unspecified D69.6       Past Medical History:        Diagnosis Date    Glaucoma     Hernia of scrotum     Hypertension     Macrocytic anemia     Pancytopenia ([address]        Past Surgical History:        Procedure Laterality Date    APPENDECTOMY      BACK SURGERY N/A     INGUINAL HERNIA REPAIR  8/6/2013    RIGHT    LAPAROSCOPIC APPENDECTOMY N/A 3/22/2019    LAPAROSCOPIC APPENDECTOMY performed by Andrei Michaud MD at [address] [place] [address] History:    Social History     Tobacco Use    Smoking status: Never    Smokeless tobacco: Never   Substance Use Topics    Alcohol use:  No                                Counseling given: Not Answered      Vital Signs (Current):

## 2023-05-09 NOTE — PROGRESS NOTES
Pt awake and oriented, medicated with dilaudid for pain and zofran for nausea.  Will reasses, IVf infusing, family updated

## 2023-05-09 NOTE — PROGRESS NOTES
Discussed discharge instructions with pt, son and daughter. Questions answered. Abd binder placed, Norco 1 tab given per orders for home. Pt tolerating jello and fluids.

## 2023-05-09 NOTE — BRIEF OP NOTE
Brief Postoperative Note      Patient: Simi Monroy  YOB: 1950  MRN: [id number]    Date of Procedure: 5/9/2023    Pre-Op Diagnosis Codes: * Ventral hernia without obstruction or gangrene [K43.9]    Post-Op Diagnosis: Same       Procedure(s):  ROBOT ASSISTED LAPAROSCOPIC VENTRAL HERNIA REPAIR WITH MESH    Surgeon(s):  Min Martin MD    Assistant:  Surgical Assistant: Kera Stewart    Anesthesia: General    Estimated Blood Loss (mL): Minimal    Complications: None    Specimens:   * No specimens in log *    Implants:  Implant Name Type Inv. Item Serial No.  Lot No. LRB No. Used Action   MESH SURG DIA4. [employer] CIR SEPRA [place] [id number]  MESH SURG DIA4. 5IN CIR [employer] VENTRALIGHT  [place] [employer]WD [id number] N/A 1 Implanted         Drains: * No LDAs found *    Findings: ventral hernia repaired with mesh      Electronically signed by Min Martin MD on 5/9/2023 at 4:53 PM

## 2023-05-09 NOTE — PROGRESS NOTES
Pts son assisted pt in safely getting dressed and IV was removed- bleeding noted at site with pressure held and dressing applied. Pt was wheeled to front lobby to be taken home by family- tolerated well.

## 2023-05-10 ENCOUNTER — TELEPHONE (OUTPATIENT)
Dept: SURGERY | Age: 73
End: 2023-05-10

## 2023-05-10 NOTE — TELEPHONE ENCOUNTER
Wants to know how long to wear the binder  05/09/2023  ROBOT ASSISTED LAPAROSCOPIC VENTRAL HERNIA REPAIR WITH MESH  Please Advise

## 2023-05-11 ENCOUNTER — HOSPITAL ENCOUNTER (EMERGENCY)
Age: 73
Discharge: HOME | End: 2023-05-12
Payer: MEDICARE

## 2023-05-11 ENCOUNTER — TELEPHONE (OUTPATIENT)
Dept: SURGERY | Age: 73
End: 2023-05-11

## 2023-05-11 DIAGNOSIS — K59.00 CONSTIPATION, UNSPECIFIED CONSTIPATION TYPE: Primary | ICD-10-CM

## 2023-05-11 DIAGNOSIS — G89.18 POST-OP PAIN: ICD-10-CM

## 2023-05-11 NOTE — TELEPHONE ENCOUNTER
Advised to take 2 capfuls of miralax in a bottle of water every 2-3 hours until starts going,  ambulate and increase fluids.    May add colace 1-2 BID

## 2023-05-11 NOTE — TELEPHONE ENCOUNTER
Patient c/o severe abdominal pain following a ventral hernia repair on 5/9/23. He stated he is taking all pain meds as directed. He does not have any complaints of nausea or vomiting. He has not had a bowel movement since before his surgery. # [phone].

## 2023-05-12 ENCOUNTER — APPOINTMENT (OUTPATIENT)
Dept: CT IMAGING | Age: 73
End: 2023-05-12
Payer: MEDICARE

## 2023-05-12 VITALS
OXYGEN SATURATION: 96 % | DIASTOLIC BLOOD PRESSURE: 92 MMHG | HEART RATE: 87 BPM | TEMPERATURE: 98.3 F | RESPIRATION RATE: 17 BRPM | SYSTOLIC BLOOD PRESSURE: 143 MMHG

## 2023-05-12 LAB
ALBUMIN SERPL-MCNC: 4.6 G/DL (ref 3.4–5)
ALBUMIN/GLOB SERPL: 1.7 {RATIO} (ref 1.1–2.2)
ALP SERPL-CCNC: 58 U/L (ref 40–129)
ALT SERPL-CCNC: 9 U/L (ref 10–40)
ANION GAP SERPL CALCULATED.3IONS-SCNC: 11 MMOL/L (ref 3–16)
AST SERPL-CCNC: 15 U/L (ref 15–37)
BASOPHILS # BLD: 0 K/UL (ref 0–0.2)
BASOPHILS NFR BLD: 0.3 %
BILIRUB SERPL-MCNC: 1 MG/DL (ref 0–1)
BILIRUB UR QL STRIP.AUTO: NEGATIVE
BUN SERPL-MCNC: 10 MG/DL (ref 7–20)
CALCIUM SERPL-MCNC: 9.5 MG/DL (ref 8.3–10.6)
CHLORIDE SERPL-SCNC: 98 MMOL/L (ref 99–110)
CLARITY UR: CLEAR
CO2 SERPL-SCNC: 29 MMOL/L (ref 21–32)
COLOR UR: YELLOW
CREAT SERPL-MCNC: 0.8 MG/DL (ref 0.8–1.3)
DEPRECATED RDW RBC AUTO: 14 % (ref 12.4–15.4)
EOSINOPHIL # BLD: 0 K/UL (ref 0–0.6)
EOSINOPHIL NFR BLD: 0.6 %
GFR SERPLBLD CREATININE-BSD FMLA CKD-EPI: >60 ML/MIN/{1.73_M2}
GLUCOSE SERPL-MCNC: 112 MG/DL (ref 70–99)
GLUCOSE UR STRIP.AUTO-MCNC: NEGATIVE MG/DL
HCT VFR BLD AUTO: 35.4 % (ref 40.5–52.5)
HGB BLD-MCNC: 12 G/DL (ref 13.5–17.5)
HGB UR QL STRIP.AUTO: NEGATIVE
KETONES UR STRIP.AUTO-MCNC: 15 MG/DL
LACTATE BLDV-SCNC: 0.9 MMOL/L (ref 0.4–1.9)
LEUKOCYTE ESTERASE UR QL STRIP.AUTO: NEGATIVE
LIPASE SERPL-CCNC: 17 U/L (ref 13–60)
LYMPHOCYTES # BLD: 0.6 K/UL (ref 1–5.1)
LYMPHOCYTES NFR BLD: 18.8 %
MCH RBC QN AUTO: 34.4 PG (ref 26–34)
MCHC RBC AUTO-ENTMCNC: 33.9 G/DL (ref 31–36)
MCV RBC AUTO: 101.6 FL (ref 80–100)
MONOCYTES # BLD: 0.1 K/UL (ref 0–1.3)
MONOCYTES NFR BLD: 2.4 %
NEUTROPHILS # BLD: 2.5 K/UL (ref 1.7–7.7)
NEUTROPHILS NFR BLD: 77.9 %
NITRITE UR QL STRIP.AUTO: NEGATIVE
PH UR STRIP.AUTO: 6 [PH] (ref 5–8)
PLATELET # BLD AUTO: 101 K/UL (ref 135–450)
PMV BLD AUTO: 9.7 FL (ref 5–10.5)
POTASSIUM SERPL-SCNC: 3.7 MMOL/L (ref 3.5–5.1)
PROT SERPL-MCNC: 7.3 G/DL (ref 6.4–8.2)
PROT UR STRIP.AUTO-MCNC: NEGATIVE MG/DL
RBC # BLD AUTO: 3.48 M/UL (ref 4.2–5.9)
SODIUM SERPL-SCNC: 138 MMOL/L (ref 136–145)
SP GR UR STRIP.AUTO: 1.01 (ref 1–1.03)
UA COMPLETE W REFLEX CULTURE PNL UR: ABNORMAL
UA DIPSTICK W REFLEX MICRO PNL UR: ABNORMAL
URN SPEC COLLECT METH UR: ABNORMAL
UROBILINOGEN UR STRIP-ACNC: 0.2 E.U./DL
WBC # BLD AUTO: 3.2 K/UL (ref 4–11)

## 2023-05-12 PROCEDURE — 99285 EMERGENCY DEPT VISIT HI MDM: CPT

## 2023-05-12 PROCEDURE — 2580000003 HC RX 258: Performed by: PHYSICIAN ASSISTANT

## 2023-05-12 PROCEDURE — 96375 TX/PRO/DX INJ NEW DRUG ADDON: CPT

## 2023-05-12 PROCEDURE — 85025 COMPLETE CBC W/AUTO DIFF WBC: CPT

## 2023-05-12 PROCEDURE — 6360000004 HC RX CONTRAST MEDICATION: Performed by: PHYSICIAN ASSISTANT

## 2023-05-12 PROCEDURE — 96374 THER/PROPH/DIAG INJ IV PUSH: CPT

## 2023-05-12 PROCEDURE — 74177 CT ABD & PELVIS W/CONTRAST: CPT

## 2023-05-12 PROCEDURE — 6360000002 HC RX W HCPCS: Performed by: PHYSICIAN ASSISTANT

## 2023-05-12 PROCEDURE — 83605 ASSAY OF LACTIC ACID: CPT

## 2023-05-12 PROCEDURE — 80053 COMPREHEN METABOLIC PANEL: CPT

## 2023-05-12 PROCEDURE — 83690 ASSAY OF LIPASE: CPT

## 2023-05-12 PROCEDURE — 81003 URINALYSIS AUTO W/O SCOPE: CPT

## 2023-05-12 RX ORDER — 0.9 % SODIUM CHLORIDE 0.9 %
1000 INTRAVENOUS SOLUTION INTRAVENOUS ONCE
Status: COMPLETED | OUTPATIENT
Start: 2023-05-12 | End: 2023-05-12

## 2023-05-12 RX ORDER — MORPHINE SULFATE 4 MG/ML
4 INJECTION, SOLUTION INTRAMUSCULAR; INTRAVENOUS EVERY 30 MIN PRN
Status: DISCONTINUED | OUTPATIENT
Start: 2023-05-12 | End: 2023-05-12 | Stop reason: HOSPADM

## 2023-05-12 RX ORDER — ONDANSETRON 2 MG/ML
4 INJECTION INTRAMUSCULAR; INTRAVENOUS EVERY 6 HOURS PRN
Status: DISCONTINUED | OUTPATIENT
Start: 2023-05-12 | End: 2023-05-12 | Stop reason: HOSPADM

## 2023-05-12 RX ADMIN — IOPAMIDOL 75 ML: 755 INJECTION, SOLUTION INTRAVENOUS at 01:05

## 2023-05-12 RX ADMIN — Medication 240 ML: at 03:30

## 2023-05-12 RX ADMIN — MORPHINE SULFATE 4 MG: 4 INJECTION, SOLUTION INTRAMUSCULAR; INTRAVENOUS at 00:54

## 2023-05-12 RX ADMIN — SODIUM CHLORIDE 1000 ML: 9 INJECTION, SOLUTION INTRAVENOUS at 00:52

## 2023-05-12 RX ADMIN — ONDANSETRON 4 MG: 2 INJECTION INTRAMUSCULAR; INTRAVENOUS at 00:53

## 2023-05-12 ASSESSMENT — ENCOUNTER SYMPTOMS
RHINORRHEA: 0
NAUSEA: 0
DIARRHEA: 0
SHORTNESS OF BREATH: 0
ABDOMINAL PAIN: 1
CONSTIPATION: 1
COUGH: 0
VOMITING: 0

## 2023-05-12 ASSESSMENT — LIFESTYLE VARIABLES
HOW OFTEN DO YOU HAVE A DRINK CONTAINING ALCOHOL: NEVER
HOW MANY STANDARD DRINKS CONTAINING ALCOHOL DO YOU HAVE ON A TYPICAL DAY: PATIENT DOES NOT DRINK

## 2023-05-12 ASSESSMENT — PAIN DESCRIPTION - LOCATION
LOCATION: ABDOMEN
LOCATION: ABDOMEN

## 2023-05-12 ASSESSMENT — PAIN SCALES - GENERAL
PAINLEVEL_OUTOF10: 2
PAINLEVEL_OUTOF10: 4

## 2023-05-12 NOTE — OP NOTE
an  incision more laterally through the peritoneum. All of the  preperitoneal fat was able to be reduced out of the hernia. We were  able to create a flap more posteriorly. At this point time, the  herniated contents had been completely reduced. The defect measured 2.5  cm in greatest diameter. It was closed in a transverse orientation with  a running 0 Stratafix suture. We selected an 11-cm Bard dual mesh. We  ran the 0 Stratafix suture back to the midpoint of the repair. The  needle on the Stratafix was then brought through the center of the mesh. We took a few bites laterally in order to secure the mesh in place. The  mesh was then sutured circumferentially the periphery using a running  2-0 V-Loc suture. All the sutures were cut. We created somewhat of a  peritoneal flap more laterally. This was closed over as much of the  meshes were covered. About 30% of the mesh anteriorly was exposed. It  was coated mesh. The posterior 70% was closed by the peritoneal flap. The closure of the peritoneal flap also gave some extra fixation of the  mesh. We had excellent hemostasis. All the trocars removed under direct  visualization and the abdomen was de-insufflated. The trocar sites had  been previously injected with 0.5% Marcaine with epinephrine. The skin  of the incisions were closed with running 4-0 subcuticular sutures. Dermabond was then applied. The patient tolerated the procedure without  difficulty and was transferred to recovery room in stable condition. Gayathri Meza.  Richard Arshad MD    D: 05/11/2023 14:46:02       T: 05/11/2023 14:49:48     [email or web address]  Job#: [id number]     Doc#: [id number]    CC:

## 2023-05-12 NOTE — ED PROVIDER NOTES
[facility]        Pt Name: Zarina Mccord  MRN: [id number]  [place] [birth year]  Date of evaluation: 5/11/2023  Provider: Ximena Black PA-C  PCP: Buck Sheikh MD  Note Started: 12:42 AM EDT 5/12/23      KATIE. I have evaluated this patient. My supervising physician was available for consultation. CHIEF COMPLAINT       Chief Complaint   Patient presents with    Post-op Problem     Pt arrives in the ED with c/o post op problem. Pt stated he had hernia surgery for third time and is having pain like he was before the surgery. Pt family stated he is not eating much either       HISTORY OF PRESENT ILLNESS: 1 or more Elements     History From: Patient  Limitations to history : None    Zarina Mccord is a 74 y.o. male who presents to the emergency department today for evaluation for abdominal pain, postoperative complication. The patient states that he underwent a ventral hernia repair on 5/9/2023. The patient states that he tolerated this without any difficulty. The patient states that when he woke up this morning he noticed a sharp pain to the right side of his lower abdomen, and he states that this is where he has had pain with the hernia in the past.  Patient states that the pain is progressed throughout the day. The patient states his pain is sharp, is currently rated as a 10/10. Patient denies any known alleviating or aggravating factors. Patient states he has been taking pain medication without much improvement at home. Patient states that he has been passing gas but otherwise has not had a bowel movement in the past 2 days. Patient denies any nausea, vomiting or diarrhea. He has no fever or chills. No cough or congestion. He has no chest pain or shortness of breath. No dysuria hematuria, no other complaints    Nursing Notes were all reviewed and agreed with or any disagreements were addressed in the HPI.     REVIEW OF

## 2023-05-24 ENCOUNTER — OFFICE VISIT (OUTPATIENT)
Dept: SURGERY | Age: 73
End: 2023-05-24

## 2023-05-24 VITALS — BODY MASS INDEX: 24.12 KG/M2 | SYSTOLIC BLOOD PRESSURE: 136 MMHG | WEIGHT: 154 LBS | DIASTOLIC BLOOD PRESSURE: 80 MMHG

## 2023-05-24 DIAGNOSIS — K43.9 SPIGELIAN HERNIA: Primary | ICD-10-CM

## 2023-05-24 PROCEDURE — 99024 POSTOP FOLLOW-UP VISIT: CPT | Performed by: SURGERY

## 2023-05-24 NOTE — PROGRESS NOTES
Subjective:      Patient ID: Grecia Eastman is a 74 y.o. male. HPI    Review of Systems    Objective:   Physical Exam  Abdomen soft  Incisions well-healed  Assessment:      77-year-old male status post robotic laparoscopic repair of a right sided spigelian hernia with mesh. Doing well postoperatively. Plan:      Continue lifting restrictions for total of 6 weeks from surgery. Follow-up as needed.         Adebayo Antoine MD

## 2023-07-07 LAB
P2PSA: 31.2 PG/ML
PHI-HYB: 42
PROSTATE SPECIFIC ANTIGEN FREE: 1.98 NG/ML
PROSTATE SPECIFIC ANTIGEN PERCENT FREE: 27.9 %
PSA, TOTAL: 7.09 NG/ML (ref 0–4)

## 2024-01-02 DIAGNOSIS — I10 UNCONTROLLED HYPERTENSION: ICD-10-CM

## 2024-01-02 NOTE — TELEPHONE ENCOUNTER
Medication:   Requested Prescriptions     Pending Prescriptions Disp Refills    chlorthalidone (HYGROTON) 25 MG tablet 90 tablet 1     Sig: TAKE 1 TABLET BY MOUTH EVERY DAY    lisinopril (PRINIVIL;ZESTRIL) 5 MG tablet 90 tablet 1     Sig: TAKE 1 TABLET BY MOUTH EVERY DAY     Last Filled:  3.27.23 3.27.23    Last appt: 5/5/2023   Next appt: Visit date not found    Last OARRS:        No data to display

## 2024-01-02 NOTE — TELEPHONE ENCOUNTER
Pt needs meds refill. Lov 5/5/23  Pt is out of meds    chlorthalidone (HYGROTON) 25 MG tablet [[id number]]     lisinopril (PRINIVIL;ZESTRIL) 5 MG tablet [[id number]]     Pharmacy    [facility]/pharmacy #[id number] - [place], [place] - [address] - P [phone] - F [phone]  [address] [facility] [facility] [address]  Phone: [phone]  Fax: [phone]

## 2024-01-03 RX ORDER — LISINOPRIL 5 MG/1
TABLET ORAL
Qty: 90 TABLET | Refills: 0 | Status: SHIPPED | OUTPATIENT
Start: 2024-01-03

## 2024-01-03 RX ORDER — CHLORTHALIDONE 25 MG/1
25 TABLET ORAL NIGHTLY
Qty: 90 TABLET | Refills: 0 | Status: SHIPPED | OUTPATIENT
Start: 2024-01-03

## 2024-04-17 ENCOUNTER — TELEPHONE (OUTPATIENT)
Dept: PHARMACY | Facility: CLINIC | Age: 74
End: 2024-04-17

## 2024-04-17 DIAGNOSIS — I10 UNCONTROLLED HYPERTENSION: ICD-10-CM

## 2024-04-17 NOTE — TELEPHONE ENCOUNTER
[facility]: ADHERENCE REVIEW  Identified care gap per [place]: fills at [facility]: ACE/ARB adherence    Patient also appears to be prescribed: ACE/ARB    ASSESSMENT    ACE/ARB ADHERENCE    Insurance Records claims through 24 (Prior Year PDC = not reported; YTD PDC = FIRST FILL  LISINOPRIL 5 MG TABLET last filled on 24 for 90 day supply. Next refill due: 24    Prescribed si tablet/capsule daily    Per Reconcile Dispense History: last filled on 24 for 90 day supply.     Per [facility]:  0 refills remaining. Pharmacy will send a refill request     BP Readings from Last 3 Encounters:   23 136/80   23 (!) 143/92   23 (!) 155/98     CrCl cannot be calculated (Patient's most recent lab result is older than the maximum 180 days allowed.).  Lab Results   Component Value Date    CREATININE 0.8 2023     Lab Results   Component Value Date    K 3.7 2023       PLAN  The following are interventions that have been identified:   Patient OVERDUE refilling LISINOPRIL 5 MG TABLET and active on home medication list.     No patient outreach planned at this time.  I will f/u to see if a new rx is sent to the pharmacy     Recent Visits  Date Type Provider Dept   23 Office Visit Elsy Nunes MD [facility]   23 Office Visit Elsy Nunes MD [facility]   22 Office Visit Elsy Nunes MD [facility]   10/31/22 Office Visit Elsy Nunes MD [facility]   Showing recent visits within past 540 days with a meds authorizing provider and meeting all other requirements  Future Appointments  No visits were found meeting these conditions.  Showing future appointments within next 150 days with a meds authorizing provider and meeting all other requirements    No future appointments.    [facility]  [facility] // Department, toll free [phone], Option 3    For Pharmacy Admin Tracking

## 2024-04-17 NOTE — TELEPHONE ENCOUNTER
Medication:   Requested Prescriptions     Pending Prescriptions Disp Refills    lisinopril (PRINIVIL;ZESTRIL) 5 MG tablet [Pharmacy Med Name: LISINOPRIL 5 MG TABLET] 30 tablet 0     Sig: TAKE 1 TABLET BY MOUTH EVERY DAY     Last Filled:  1/3/2024    Last appt: 5/5/2023   Next appt: States will call right back to make appointment, requesting 30 day supply until he can get in to be seen.     Last OARRS:        No data to display

## 2024-04-18 RX ORDER — LISINOPRIL 5 MG/1
TABLET ORAL
Qty: 30 TABLET | Refills: 0 | Status: SHIPPED | OUTPATIENT
Start: 2024-04-18

## 2024-05-02 DIAGNOSIS — I10 UNCONTROLLED HYPERTENSION: ICD-10-CM

## 2024-05-02 RX ORDER — LISINOPRIL 5 MG/1
TABLET ORAL
Qty: 30 TABLET | Refills: 0 | Status: SHIPPED | OUTPATIENT
Start: 2024-05-02

## 2024-05-02 NOTE — TELEPHONE ENCOUNTER
Medication:   Requested Prescriptions     Pending Prescriptions Disp Refills    lisinopril (PRINIVIL;ZESTRIL) 5 MG tablet [Pharmacy Med Name: LISINOPRIL 5 MG TABLET] 30 tablet 0     Sig: TAKE 1 TABLET BY MOUTH EVERY DAY     Last Filled:  4/18/2024    Last appt: 5/5/2023   Next appt: 5/20/2024    Last OARRS:        No data to display

## 2024-05-20 ENCOUNTER — OFFICE VISIT (OUTPATIENT)
Dept: PRIMARY CARE CLINIC | Age: 74
End: 2024-05-20

## 2024-05-20 VITALS
TEMPERATURE: 97.6 F | OXYGEN SATURATION: 99 % | SYSTOLIC BLOOD PRESSURE: 138 MMHG | HEART RATE: 64 BPM | HEIGHT: 69 IN | BODY MASS INDEX: 22.78 KG/M2 | WEIGHT: 153.8 LBS | DIASTOLIC BLOOD PRESSURE: 76 MMHG

## 2024-05-20 DIAGNOSIS — D61.818 PANCYTOPENIA (HCC): ICD-10-CM

## 2024-05-20 DIAGNOSIS — I10 BENIGN ESSENTIAL HTN: ICD-10-CM

## 2024-05-20 DIAGNOSIS — Z01.818 PREOP EXAMINATION: Primary | ICD-10-CM

## 2024-05-20 DIAGNOSIS — R01.1 SYSTOLIC MURMUR: ICD-10-CM

## 2024-05-20 DIAGNOSIS — D69.6 THROMBOCYTOPENIA, UNSPECIFIED (HCC): ICD-10-CM

## 2024-05-20 DIAGNOSIS — H25.9 SENILE CATARACT OF LEFT EYE, UNSPECIFIED AGE-RELATED CATARACT TYPE: ICD-10-CM

## 2024-05-20 DIAGNOSIS — I10 UNCONTROLLED HYPERTENSION: ICD-10-CM

## 2024-05-20 DIAGNOSIS — N40.0 BENIGN PROSTATIC HYPERPLASIA, UNSPECIFIED WHETHER LOWER URINARY TRACT SYMPTOMS PRESENT: ICD-10-CM

## 2024-05-20 LAB
ANION GAP SERPL CALCULATED.3IONS-SCNC: 11 MMOL/L (ref 3–16)
BASOPHILS # BLD: 0 K/UL (ref 0–0.2)
BASOPHILS NFR BLD: 0.4 %
BUN SERPL-MCNC: 17 MG/DL (ref 7–20)
CALCIUM SERPL-MCNC: 9 MG/DL (ref 8.3–10.6)
CHLORIDE SERPL-SCNC: 105 MMOL/L (ref 99–110)
CO2 SERPL-SCNC: 28 MMOL/L (ref 21–32)
CREAT SERPL-MCNC: 0.9 MG/DL (ref 0.8–1.3)
DEPRECATED RDW RBC AUTO: 14.6 % (ref 12.4–15.4)
EOSINOPHIL # BLD: 0.1 K/UL (ref 0–0.6)
EOSINOPHIL NFR BLD: 2.8 %
GFR SERPLBLD CREATININE-BSD FMLA CKD-EPI: 89 ML/MIN/{1.73_M2}
GLUCOSE SERPL-MCNC: 106 MG/DL (ref 70–99)
HCT VFR BLD AUTO: 33.6 % (ref 40.5–52.5)
HGB BLD-MCNC: 11.5 G/DL (ref 13.5–17.5)
LYMPHOCYTES # BLD: 0.9 K/UL (ref 1–5.1)
LYMPHOCYTES NFR BLD: 30.7 %
MCH RBC QN AUTO: 35.8 PG (ref 26–34)
MCHC RBC AUTO-ENTMCNC: 34.2 G/DL (ref 31–36)
MCV RBC AUTO: 104.5 FL (ref 80–100)
MONOCYTES # BLD: 0.1 K/UL (ref 0–1.3)
MONOCYTES NFR BLD: 3.2 %
NEUTROPHILS # BLD: 1.8 K/UL (ref 1.7–7.7)
NEUTROPHILS NFR BLD: 62.9 %
PLATELET # BLD AUTO: 92 K/UL (ref 135–450)
PMV BLD AUTO: 10.3 FL (ref 5–10.5)
POTASSIUM SERPL-SCNC: 4.2 MMOL/L (ref 3.5–5.1)
RBC # BLD AUTO: 3.22 M/UL (ref 4.2–5.9)
SODIUM SERPL-SCNC: 144 MMOL/L (ref 136–145)
WBC # BLD AUTO: 2.9 K/UL (ref 4–11)

## 2024-05-20 RX ORDER — LISINOPRIL 5 MG/1
5 TABLET ORAL DAILY
Qty: 90 TABLET | Refills: 1 | Status: SHIPPED | OUTPATIENT
Start: 2024-05-20

## 2024-05-20 RX ORDER — TAMSULOSIN HYDROCHLORIDE 0.4 MG/1
0.4 CAPSULE ORAL DAILY
Qty: 90 CAPSULE | Refills: 1 | Status: SHIPPED | OUTPATIENT
Start: 2024-05-20

## 2024-05-20 SDOH — ECONOMIC STABILITY: FOOD INSECURITY: WITHIN THE PAST 12 MONTHS, YOU WORRIED THAT YOUR FOOD WOULD RUN OUT BEFORE YOU GOT MONEY TO BUY MORE.: NEVER TRUE

## 2024-05-20 SDOH — ECONOMIC STABILITY: FOOD INSECURITY: WITHIN THE PAST 12 MONTHS, THE FOOD YOU BOUGHT JUST DIDN'T LAST AND YOU DIDN'T HAVE MONEY TO GET MORE.: NEVER TRUE

## 2024-05-20 SDOH — ECONOMIC STABILITY: INCOME INSECURITY: HOW HARD IS IT FOR YOU TO PAY FOR THE VERY BASICS LIKE FOOD, HOUSING, MEDICAL CARE, AND HEATING?: NOT HARD AT ALL

## 2024-05-20 ASSESSMENT — PATIENT HEALTH QUESTIONNAIRE - PHQ9
SUM OF ALL RESPONSES TO PHQ9 QUESTIONS 1 & 2: 0
SUM OF ALL RESPONSES TO PHQ QUESTIONS 1-9: 0
SUM OF ALL RESPONSES TO PHQ QUESTIONS 1-9: 0
2. FEELING DOWN, DEPRESSED OR HOPELESS: NOT AT ALL
SUM OF ALL RESPONSES TO PHQ QUESTIONS 1-9: 0
1. LITTLE INTEREST OR PLEASURE IN DOING THINGS: NOT AT ALL
SUM OF ALL RESPONSES TO PHQ QUESTIONS 1-9: 0

## 2024-05-20 NOTE — PROGRESS NOTES
indications for pre-operative beta-blocker use:    Vascular surgery with history of postitive stress test  Intermediate or high risk surgery with history of CAD   Intermediate or high risk surgery with multiple clinical predictors of CAD- 2 of the following: history of compensated or prior heart failure, history of cerebrovascular disease, DM, or renal insufficiency    Routine administration of higher-dose, long-acting metoprolol in beta-blocker-naïve patients on the day of surgery, and in the absence of dose titration is associated with an overall increase in mortality.  Beta-blockers should be started days to weeks prior to surgery and titrated to pulse < 70.  4. Deep vein thrombosis prophylaxis: regimen to be chosen by surgical team  5. No contraindications to planned surgery

## 2024-05-21 DIAGNOSIS — D53.1 MEGALOBLASTIC ANEMIA: Primary | ICD-10-CM

## 2024-05-22 DIAGNOSIS — D53.1 MEGALOBLASTIC ANEMIA: ICD-10-CM

## 2024-05-22 LAB
HCT VFR BLD AUTO: 33.4 % (ref 40.5–52.5)
IMMATURE RETIC FRACT: 0.6 (ref 0.21–0.37)
REASON FOR REJECTION: NORMAL
REJECTED TEST: NORMAL
RETICS # AUTO: 0.04 M/UL
RETICS/RBC NFR AUTO: 1.09 % (ref 0.5–2.18)

## 2024-05-23 ENCOUNTER — TELEPHONE (OUTPATIENT)
Dept: PRIMARY CARE CLINIC | Age: 74
End: 2024-05-23

## 2024-05-23 ENCOUNTER — NURSE ONLY (OUTPATIENT)
Dept: PRIMARY CARE CLINIC | Age: 74
End: 2024-05-23
Payer: COMMERCIAL

## 2024-05-23 DIAGNOSIS — Z01.818 PREOP EXAMINATION: Primary | ICD-10-CM

## 2024-05-23 DIAGNOSIS — Z01.818 PREOP EXAMINATION: ICD-10-CM

## 2024-05-23 DIAGNOSIS — E53.8 B12 DEFICIENCY: Primary | ICD-10-CM

## 2024-05-23 LAB
FOLATE SERPL-MCNC: 16.18 NG/ML (ref 4.78–24.2)
VIT B12 SERPL-MCNC: 267 PG/ML (ref 211–911)

## 2024-05-23 PROCEDURE — 96372 THER/PROPH/DIAG INJ SC/IM: CPT | Performed by: STUDENT IN AN ORGANIZED HEALTH CARE EDUCATION/TRAINING PROGRAM

## 2024-05-23 RX ORDER — CYANOCOBALAMIN 1000 UG/ML
1000 INJECTION, SOLUTION INTRAMUSCULAR; SUBCUTANEOUS ONCE
Status: COMPLETED | OUTPATIENT
Start: 2024-05-23 | End: 2024-05-23

## 2024-05-23 RX ADMIN — CYANOCOBALAMIN 1000 MCG: 1000 INJECTION, SOLUTION INTRAMUSCULAR; SUBCUTANEOUS at 12:24

## 2024-05-23 NOTE — TELEPHONE ENCOUNTER
[facility] called and want to inform Dr prajapati a rejected lab specimen- Folate  Rosina trejo will give pt a call.

## 2024-05-24 ENCOUNTER — TELEPHONE (OUTPATIENT)
Dept: PRIMARY CARE CLINIC | Age: 74
End: 2024-05-24

## 2024-05-24 NOTE — TELEPHONE ENCOUNTER
All of his blood cell lines are still low (WBC,RBC, and platelets) and will need to follow up with hematologist.  I believe he has seen one in the past.

## 2024-05-24 NOTE — TELEPHONE ENCOUNTER
Pts daughter calling in to check how the other labs were besides the b12 , she's wondering about his levels on the rest of the blood work , please advise

## 2024-05-24 NOTE — TELEPHONE ENCOUNTER
Spoke with patient daughter, Noman Ruggiero per HIPAA. She verbalized understanding and will call Dr. Raymond hematologist to get patient scheduled.

## 2024-10-21 DIAGNOSIS — I10 UNCONTROLLED HYPERTENSION: ICD-10-CM

## 2024-10-22 RX ORDER — CHLORTHALIDONE 25 MG/1
25 TABLET ORAL DAILY
Qty: 90 TABLET | Refills: 0 | Status: SHIPPED | OUTPATIENT
Start: 2024-10-22

## 2024-10-22 NOTE — TELEPHONE ENCOUNTER
Medication:   Requested Prescriptions     Pending Prescriptions Disp Refills    chlorthalidone (HYGROTON) 25 MG tablet [Pharmacy Med Name: CHLORTHALIDONE 25 MG TABLET] 90 tablet 0     Sig: TAKE 1 TABLET BY MOUTH EVERY DAY     Last Filled:  1/3/2024    Last appt: 5/20/2024   Next appt: Visit date not found    Last OARRS:        No data to display

## 2024-12-29 DIAGNOSIS — I10 UNCONTROLLED HYPERTENSION: ICD-10-CM

## 2024-12-30 RX ORDER — CHLORTHALIDONE 25 MG/1
25 TABLET ORAL DAILY
Qty: 90 TABLET | Refills: 0 | Status: SHIPPED | OUTPATIENT
Start: 2024-12-30

## 2024-12-30 RX ORDER — LISINOPRIL 5 MG/1
5 TABLET ORAL DAILY
Qty: 90 TABLET | Refills: 1 | Status: SHIPPED | OUTPATIENT
Start: 2024-12-30

## 2024-12-30 NOTE — TELEPHONE ENCOUNTER
Medication:   Requested Prescriptions     Pending Prescriptions Disp Refills    lisinopril (PRINIVIL;ZESTRIL) 5 MG tablet [Pharmacy Med Name: LISINOPRIL 5 MG TABLET] 90 tablet 1     Sig: TAKE 1 TABLET BY MOUTH EVERY DAY    chlorthalidone (HYGROTON) 25 MG tablet [Pharmacy Med Name: CHLORTHALIDONE 25 MG TABLET] 90 tablet 0     Sig: TAKE 1 TABLET BY MOUTH EVERY DAY     Last Filled:  chlorthalidone - 10/22/24  Lisinopril - 5/20/24    Last appt: 5/20/2024   Next appt: Sent mcm, pt due for appt     Last OARRS:        No data to display

## 2025-04-09 DIAGNOSIS — I10 UNCONTROLLED HYPERTENSION: ICD-10-CM

## 2025-04-09 RX ORDER — CHLORTHALIDONE 25 MG/1
25 TABLET ORAL DAILY
Qty: 90 TABLET | Refills: 0 | Status: SHIPPED | OUTPATIENT
Start: 2025-04-09

## 2025-04-09 NOTE — TELEPHONE ENCOUNTER
Medication:   Requested Prescriptions     Pending Prescriptions Disp Refills    chlorthalidone (HYGROTON) 25 MG tablet [Pharmacy Med Name: CHLORTHALIDONE 25 MG TABLET] 90 tablet 0     Sig: TAKE 1 TABLET BY MOUTH EVERY DAY     Last Filled:  12/30/24    Last appt: 5/20/2024   Next appt: Visit date not found    Last OARRS:        No data to display

## 2025-07-14 DIAGNOSIS — I10 UNCONTROLLED HYPERTENSION: ICD-10-CM

## 2025-07-14 NOTE — TELEPHONE ENCOUNTER
Medication:   Requested Prescriptions     Pending Prescriptions Disp Refills    lisinopril (PRINIVIL;ZESTRIL) 5 MG tablet [Pharmacy Med Name: LISINOPRIL 5 MG TABLET] 90 tablet 1     Sig: TAKE 1 TABLET BY MOUTH EVERY DAY     Last Filled:  12.30.24    Last appt: 5/20/2024   Next appt: Visit date not found  Left message for return call.  Due ov    Last OARRS:        No data to display

## 2025-07-15 DIAGNOSIS — I10 UNCONTROLLED HYPERTENSION: ICD-10-CM

## 2025-07-15 NOTE — TELEPHONE ENCOUNTER
Medication:   Requested Prescriptions     Pending Prescriptions Disp Refills    chlorthalidone (HYGROTON) 25 MG tablet [Pharmacy Med Name: CHLORTHALIDONE 25 MG TABLET] 90 tablet 0     Sig: TAKE 1 TABLET BY MOUTH EVERY DAY     Last Filled:  4.9.25    Last appt: 5/20/2024   Next appt: Visit date not found  Vm due for ov    Last OARRS:        No data to display

## 2025-07-16 RX ORDER — LISINOPRIL 5 MG/1
5 TABLET ORAL DAILY
Qty: 90 TABLET | Refills: 0 | Status: SHIPPED | OUTPATIENT
Start: 2025-07-16

## 2025-07-16 NOTE — TELEPHONE ENCOUNTER
Medication:   Requested Prescriptions     Pending Prescriptions Disp Refills    lisinopril (PRINIVIL;ZESTRIL) 5 MG tablet [Pharmacy Med Name: LISINOPRIL 5 MG TABLET] 90 tablet 1     Sig: TAKE 1 TABLET BY MOUTH EVERY DAY     Last Filled:  12.30.24    Last appt: 5/20/2024   Next appt: Last OARRS:        No data to display

## 2025-07-24 RX ORDER — CHLORTHALIDONE 25 MG/1
25 TABLET ORAL DAILY
Qty: 90 TABLET | Refills: 0 | Status: SHIPPED | OUTPATIENT
Start: 2025-07-24

## (undated) DEVICE — PLUMEPORT ACTIV LAPAROSCOPIC SMOKE FILTRATION DEVICE: Brand: PLUMEPORT ACTIVE

## (undated) DEVICE — SUTURE VCRL SZ 0 L27IN ABSRB VLT L22MM CT-3 1/2 CIR J329H

## (undated) DEVICE — BLADELESS OBTURATOR: Brand: WECK VISTA

## (undated) DEVICE — SEALER LAP L37CM MARYLAND JAW OPN NANO COAT MULTIFUNCTIONAL

## (undated) DEVICE — CHLORAPREP 26ML ORANGE

## (undated) DEVICE — CORD ES L10FT MPLR LAP

## (undated) DEVICE — ROBOTIC PK

## (undated) DEVICE — SKIN AFFIX SURG ADHESIVE 72/CS 0.55ML: Brand: MEDLINE

## (undated) DEVICE — LIGHT HANDLE: Brand: DEVON

## (undated) DEVICE — STERILE POLYISOPRENE POWDER-FREE SURGICAL GLOVES: Brand: PROTEXIS

## (undated) DEVICE — INSUFFLATION NEEDLE TO ESTABLISH PNEUMOPERITONEUM.: Brand: INSUFFLATION NEEDLE

## (undated) DEVICE — PENCIL ES L3M BTTN SWCH S STL HEX LOK BLDE ELECTRD HOLSTER

## (undated) DEVICE — APPLICATOR MEDICATED 26 CC SOLUTION HI LT ORNG CHLORAPREP

## (undated) DEVICE — ELECTRODE PT RET AD L9FT HI MOIST COND ADH HYDRGEL CORDED

## (undated) DEVICE — ADHESIVE SKIN CLSR 0.7ML TOP DERMBND ADV

## (undated) DEVICE — CONTROL SYRINGE LUER-LOCK TIP: Brand: MONOJECT

## (undated) DEVICE — NEEDLE,22GX1.5",REG,BEVEL: Brand: MEDLINE

## (undated) DEVICE — ARM DRAPE

## (undated) DEVICE — TUBING INSUFFLATION 10FT HIGH FLOW LEUR

## (undated) DEVICE — 30978 SEE SHARP - ENHANCED INTRAOPERATIVE LAPAROSCOPE CLEANING & DEFOGGING: Brand: 30978 SEE SHARP - ENHANCED INTRAOPERATIVE LAPAROSCOPE CLEANING & DEFOGGING

## (undated) DEVICE — TROCAR ENDOSCP L100MM DIA12MM BLDELSS OBT RADLUC STBL SL

## (undated) DEVICE — MERCY FAIRFIELD TURNOVER KIT: Brand: MEDLINE INDUSTRIES, INC.

## (undated) DEVICE — SYRINGE, LUER LOCK, 10ML: Brand: MEDLINE

## (undated) DEVICE — TIP COVER ACCESSORY

## (undated) DEVICE — SUTURE VLOC 90 2/0 VL 6 GS-22 VLOCM2105

## (undated) DEVICE — SUTURE VCRL + SZ 4-0 L18IN ABSRB UD L19MM PS-2 3/8 CIR PRIM VCP496H

## (undated) DEVICE — TROCAR ENDOSCP L100MM DIA5MM BLDELSS STBL SL OBT RADLUC

## (undated) DEVICE — NEEDLE INSUF L120MM DIA2MM DISP FOR PNEUMOPERI ENDOPATH

## (undated) DEVICE — SOLUTION IV IRRIG POUR BRL 0.9% SODIUM CHL 2F7124

## (undated) DEVICE — TISSUE RETRIEVAL SYSTEM: Brand: INZII RETRIEVAL SYSTEM

## (undated) DEVICE — SUTURE 0 STRATAFIX SYMMETRIC PDS + 23CM CT-2 SXPP1A446

## (undated) DEVICE — LOOP LIG SUT SZ 0 L18IN ABSRB POLYDIOXANONE MFIL PDS II

## (undated) DEVICE — BLANKET WRM W29.9XL79.1IN UP BODY FORC AIR MISTRAL-AIR

## (undated) DEVICE — CANNULA SEAL

## (undated) DEVICE — 3M™ IOBAN™ 2 ANTIMICROBIAL INCISE DRAPE 6650EZ: Brand: IOBAN™ 2

## (undated) DEVICE — DRAPE,LAP,CHOLE,W/TROUGHS,STERILE: Brand: MEDLINE

## (undated) DEVICE — NEEDLE HYPO 22GA L1.5IN BLK POLYPR HUB S STL REG BVL STR

## (undated) DEVICE — MARKER,SKIN,WI/RULER AND LABELS: Brand: MEDLINE

## (undated) DEVICE — LAPAROSCOPIC SCISSORS: Brand: EPIX LAPAROSCOPIC SCISSORS

## (undated) DEVICE — SUTURE VCRL SZ 4-0 L18IN ABSRB UD L19MM PS-2 3/8 CIR PRIM J496H

## (undated) DEVICE — SUTURE V-LOC 180 SZ 2-0 L9IN ABSRB GRN L27MM GS-22 1/2 CIR VLOCL2145

## (undated) DEVICE — CUTTER ENDOSCP L340MM LIN ARTC SGL STROKE FIRING ENDOPATH

## (undated) DEVICE — KIT OR ROOM TURNOVER W/STRAP

## (undated) DEVICE — GOWN AURORA NONREINF XXL: Brand: MEDLINE INDUSTRIES, INC.